# Patient Record
Sex: FEMALE | Race: WHITE | NOT HISPANIC OR LATINO | Employment: OTHER | URBAN - NONMETROPOLITAN AREA
[De-identification: names, ages, dates, MRNs, and addresses within clinical notes are randomized per-mention and may not be internally consistent; named-entity substitution may affect disease eponyms.]

---

## 2017-02-03 ENCOUNTER — ANESTHESIA (OUTPATIENT)
Dept: SURGERY | Facility: HOSPITAL | Age: 67
End: 2017-02-03
Payer: COMMERCIAL

## 2017-02-03 ENCOUNTER — ANESTHESIA EVENT (OUTPATIENT)
Dept: SURGERY | Facility: HOSPITAL | Age: 67
End: 2017-02-03
Payer: COMMERCIAL

## 2017-02-03 PROCEDURE — 25000125 ZZHC RX 250: Performed by: NURSE ANESTHETIST, CERTIFIED REGISTERED

## 2017-02-03 PROCEDURE — 45380 COLONOSCOPY AND BIOPSY: CPT | Performed by: ANESTHESIOLOGY

## 2017-02-03 PROCEDURE — 25000128 H RX IP 250 OP 636: Performed by: NURSE ANESTHETIST, CERTIFIED REGISTERED

## 2017-02-03 PROCEDURE — 45380 COLONOSCOPY AND BIOPSY: CPT | Performed by: NURSE ANESTHETIST, CERTIFIED REGISTERED

## 2017-02-03 RX ORDER — PROPOFOL 10 MG/ML
INJECTION, EMULSION INTRAVENOUS PRN
Status: DISCONTINUED | OUTPATIENT
Start: 2017-02-03 | End: 2017-02-03

## 2017-02-03 RX ORDER — LIDOCAINE HYDROCHLORIDE 20 MG/ML
INJECTION, SOLUTION INFILTRATION; PERINEURAL PRN
Status: DISCONTINUED | OUTPATIENT
Start: 2017-02-03 | End: 2017-02-03

## 2017-02-03 RX ADMIN — PROPOFOL 20 MG: 10 INJECTION, EMULSION INTRAVENOUS at 14:18

## 2017-02-03 RX ADMIN — PROPOFOL 20 MG: 10 INJECTION, EMULSION INTRAVENOUS at 14:06

## 2017-02-03 RX ADMIN — MIDAZOLAM HYDROCHLORIDE 2 MG: 1 INJECTION, SOLUTION INTRAMUSCULAR; INTRAVENOUS at 13:56

## 2017-02-03 RX ADMIN — PROPOFOL 30 MG: 10 INJECTION, EMULSION INTRAVENOUS at 14:00

## 2017-02-03 RX ADMIN — PROPOFOL 20 MG: 10 INJECTION, EMULSION INTRAVENOUS at 14:13

## 2017-02-03 RX ADMIN — PROPOFOL 20 MG: 10 INJECTION, EMULSION INTRAVENOUS at 14:09

## 2017-02-03 RX ADMIN — PROPOFOL 20 MG: 10 INJECTION, EMULSION INTRAVENOUS at 14:16

## 2017-02-03 RX ADMIN — LIDOCAINE HYDROCHLORIDE 40 MG: 20 INJECTION, SOLUTION INFILTRATION; PERINEURAL at 14:00

## 2017-02-03 RX ADMIN — PROPOFOL 20 MG: 10 INJECTION, EMULSION INTRAVENOUS at 14:03

## 2017-02-03 RX ADMIN — PROPOFOL 20 MG: 10 INJECTION, EMULSION INTRAVENOUS at 14:01

## 2017-02-03 NOTE — ANESTHESIA CARE TRANSFER NOTE
Patient: Fina Matt    Procedure(s):  Colonoscopy with biopsy - Wound Class: II-Clean Contaminated    Diagnosis: IBD  Diagnosis Additional Information: No value filed.    Anesthesia Type:   MAC     Note:  Airway :Nasal Cannula  Patient transferred to:Phase II        Vitals: (Last set prior to Anesthesia Care Transfer)    CRNA VITALS  2/3/2017 1353 - 2/3/2017 1426      2/3/2017             Resp Rate (set): 8                Electronically Signed By: RANDA Wells CRNA  February 3, 2017  2:26 PM

## 2017-02-03 NOTE — ANESTHESIA PREPROCEDURE EVALUATION
Anesthesia Evaluation     . Pt has had prior anesthetic.     History of anesthetic complications  - PONV    ROS/MED HX    ENT/Pulmonary:       Neurologic:  - neg neurologic ROS     Cardiovascular:  - neg cardiovascular ROS       METS/Exercise Tolerance:     Hematologic:  - neg hematologic  ROS       Musculoskeletal:   (+) arthritis, , , other musculoskeletal- Chronic Back pain      GI/Hepatic:     (+) GERD Inflammatory bowel disease, bowel prep, Other GI/Hepatic Diverticulosis of colon      Renal/Genitourinary:     (+) Other Renal/ Genitourinary, Urinary incontinence with continuous leakage      Endo:  - neg endo ROS       Psychiatric:  - neg psychiatric ROS       Infectious Disease:  - neg infectious disease ROS       Malignancy:         Other:    (+) H/O Chronic Pain,  - neg other ROS           Physical Exam  Normal systems: cardiovascular and pulmonary    Airway   Mallampati: III  TM distance: >3 FB  Neck ROM: full    Dental   (+) caps    Cardiovascular   Rhythm and rate: regular and normal      Pulmonary    breath sounds clear to auscultation                    Anesthesia Plan      History & Physical Review  History and physical reviewed and following examination; no interval change.    ASA Status:  2 .    NPO Status:  > 8 hours    Plan for MAC with Intravenous and Propofol induction. Maintenance will be TIVA.    PONV prophylaxis:  Ondansetron (or other 5HT-3) and Dexamethasone or Solumedrol  Surgeon requests deep sedation. Patient has a Mallampati 3 airway. Will provide MAC.      Postoperative Care  Postoperative pain management:  Multi-modal analgesia.      Consents  Anesthetic plan, risks, benefits and alternatives discussed with:  Patient..                          .

## 2017-02-06 NOTE — ANESTHESIA POSTPROCEDURE EVALUATION
Patient: Fina Matt    Procedure(s):  Colonoscopy with biopsy - Wound Class: II-Clean Contaminated    Diagnosis:IBD  Diagnosis Additional Information: No value filed.    Anesthesia Type:  MAC    Note:  Anesthesia Post Evaluation    Patient location during evaluation: Phase 2 and Bedside  Patient participation: Able to fully participate in evaluation  Level of consciousness: awake and alert  Pain management: adequate  Airway patency: patent  Cardiovascular status: acceptable  Respiratory status: acceptable  Hydration status: stable  PONV: none     Anesthetic complications: None          Last vitals:  Filed Vitals:    02/03/17 1440 02/03/17 1445 02/03/17 1450   BP: 106/72 120/77 113/74   Resp: 16  16   SpO2: 94% 93% 96%         Electronically Signed By: Wilman Zambrano MD  February 6, 2017  5:47 AM

## 2019-06-06 ENCOUNTER — TRANSFERRED RECORDS (OUTPATIENT)
Dept: HEALTH INFORMATION MANAGEMENT | Facility: OTHER | Age: 69
End: 2019-06-06

## 2019-10-03 ENCOUNTER — HEALTH MAINTENANCE LETTER (OUTPATIENT)
Age: 69
End: 2019-10-03

## 2020-01-08 ENCOUNTER — TRANSFERRED RECORDS (OUTPATIENT)
Dept: HEALTH INFORMATION MANAGEMENT | Facility: OTHER | Age: 70
End: 2020-01-08

## 2020-02-08 ENCOUNTER — HEALTH MAINTENANCE LETTER (OUTPATIENT)
Age: 70
End: 2020-02-08

## 2020-11-07 ENCOUNTER — HEALTH MAINTENANCE LETTER (OUTPATIENT)
Age: 70
End: 2020-11-07

## 2020-12-03 ENCOUNTER — TRANSFERRED RECORDS (OUTPATIENT)
Dept: HEALTH INFORMATION MANAGEMENT | Facility: OTHER | Age: 70
End: 2020-12-03

## 2020-12-04 ENCOUNTER — HOSPITAL ENCOUNTER (OUTPATIENT)
Dept: GENERAL RADIOLOGY | Facility: OTHER | Age: 70
End: 2020-12-04
Attending: FAMILY MEDICINE
Payer: COMMERCIAL

## 2020-12-04 ENCOUNTER — HOSPITAL ENCOUNTER (OUTPATIENT)
Dept: MAMMOGRAPHY | Facility: OTHER | Age: 70
End: 2020-12-04
Attending: FAMILY MEDICINE
Payer: COMMERCIAL

## 2020-12-04 ENCOUNTER — OFFICE VISIT (OUTPATIENT)
Dept: FAMILY MEDICINE | Facility: OTHER | Age: 70
End: 2020-12-04
Attending: FAMILY MEDICINE
Payer: COMMERCIAL

## 2020-12-04 VITALS
SYSTOLIC BLOOD PRESSURE: 124 MMHG | RESPIRATION RATE: 16 BRPM | HEIGHT: 64 IN | HEART RATE: 73 BPM | DIASTOLIC BLOOD PRESSURE: 66 MMHG | TEMPERATURE: 97.7 F | BODY MASS INDEX: 23.76 KG/M2 | OXYGEN SATURATION: 97 % | WEIGHT: 139.2 LBS

## 2020-12-04 DIAGNOSIS — R22.41 FOOT MASS, RIGHT: ICD-10-CM

## 2020-12-04 DIAGNOSIS — Z00.00 ENCOUNTER FOR MEDICARE ANNUAL WELLNESS EXAM: Primary | ICD-10-CM

## 2020-12-04 DIAGNOSIS — Z23 ENCOUNTER FOR IMMUNIZATION: ICD-10-CM

## 2020-12-04 DIAGNOSIS — Z13.220 SCREENING CHOLESTEROL LEVEL: ICD-10-CM

## 2020-12-04 DIAGNOSIS — R25.2 MUSCLE CRAMPS: ICD-10-CM

## 2020-12-04 DIAGNOSIS — Z12.31 VISIT FOR SCREENING MAMMOGRAM: ICD-10-CM

## 2020-12-04 DIAGNOSIS — L57.0 AK (ACTINIC KERATOSIS): ICD-10-CM

## 2020-12-04 DIAGNOSIS — L65.9 HAIR LOSS: ICD-10-CM

## 2020-12-04 LAB
ALBUMIN SERPL-MCNC: 4.5 G/DL (ref 3.5–5.7)
ALP SERPL-CCNC: 57 U/L (ref 34–104)
ALT SERPL W P-5'-P-CCNC: 12 U/L (ref 7–52)
ANION GAP SERPL CALCULATED.3IONS-SCNC: 5 MMOL/L (ref 3–14)
AST SERPL W P-5'-P-CCNC: 16 U/L (ref 13–39)
BILIRUB SERPL-MCNC: 0.5 MG/DL (ref 0.3–1)
BUN SERPL-MCNC: 19 MG/DL (ref 7–25)
CALCIUM SERPL-MCNC: 9.7 MG/DL (ref 8.6–10.3)
CHLORIDE SERPL-SCNC: 104 MMOL/L (ref 98–107)
CHOLEST SERPL-MCNC: 168 MG/DL
CO2 SERPL-SCNC: 30 MMOL/L (ref 21–31)
CREAT SERPL-MCNC: 0.8 MG/DL (ref 0.6–1.2)
ERYTHROCYTE [DISTWIDTH] IN BLOOD BY AUTOMATED COUNT: 12.8 % (ref 10–15)
GFR SERPL CREATININE-BSD FRML MDRD: 71 ML/MIN/{1.73_M2}
GLUCOSE SERPL-MCNC: 104 MG/DL (ref 70–105)
HCT VFR BLD AUTO: 42.9 % (ref 35–47)
HDLC SERPL-MCNC: 63 MG/DL (ref 23–92)
HGB BLD-MCNC: 13.8 G/DL (ref 11.7–15.7)
LDLC SERPL CALC-MCNC: 92 MG/DL
MAGNESIUM SERPL-MCNC: 2 MG/DL (ref 1.9–2.7)
MCH RBC QN AUTO: 29.1 PG (ref 26.5–33)
MCHC RBC AUTO-ENTMCNC: 32.2 G/DL (ref 31.5–36.5)
MCV RBC AUTO: 90 FL (ref 78–100)
NONHDLC SERPL-MCNC: 105 MG/DL
PLATELET # BLD AUTO: 270 10E9/L (ref 150–450)
POTASSIUM SERPL-SCNC: 4.9 MMOL/L (ref 3.5–5.1)
PROT SERPL-MCNC: 7 G/DL (ref 6.4–8.9)
RBC # BLD AUTO: 4.75 10E12/L (ref 3.8–5.2)
SODIUM SERPL-SCNC: 139 MMOL/L (ref 134–144)
TRIGL SERPL-MCNC: 66 MG/DL
WBC # BLD AUTO: 4.7 10E9/L (ref 4–11)

## 2020-12-04 PROCEDURE — 73630 X-RAY EXAM OF FOOT: CPT | Mod: RT

## 2020-12-04 PROCEDURE — 77063 BREAST TOMOSYNTHESIS BI: CPT

## 2020-12-04 PROCEDURE — G0009 ADMIN PNEUMOCOCCAL VACCINE: HCPCS

## 2020-12-04 PROCEDURE — G0463 HOSPITAL OUTPT CLINIC VISIT: HCPCS

## 2020-12-04 PROCEDURE — 80053 COMPREHEN METABOLIC PANEL: CPT | Mod: ZL | Performed by: FAMILY MEDICINE

## 2020-12-04 PROCEDURE — 83735 ASSAY OF MAGNESIUM: CPT | Mod: ZL | Performed by: FAMILY MEDICINE

## 2020-12-04 PROCEDURE — G0008 ADMIN INFLUENZA VIRUS VAC: HCPCS

## 2020-12-04 PROCEDURE — 80061 LIPID PANEL: CPT | Mod: ZL | Performed by: FAMILY MEDICINE

## 2020-12-04 PROCEDURE — 36415 COLL VENOUS BLD VENIPUNCTURE: CPT | Mod: ZL | Performed by: FAMILY MEDICINE

## 2020-12-04 PROCEDURE — G0463 HOSPITAL OUTPT CLINIC VISIT: HCPCS | Mod: 25

## 2020-12-04 PROCEDURE — 85027 COMPLETE CBC AUTOMATED: CPT | Mod: ZL | Performed by: FAMILY MEDICINE

## 2020-12-04 PROCEDURE — 99397 PER PM REEVAL EST PAT 65+ YR: CPT | Mod: 33 | Performed by: FAMILY MEDICINE

## 2020-12-04 RX ORDER — OMEPRAZOLE 40 MG/1
CAPSULE, DELAYED RELEASE ORAL
COMMUNITY
Start: 2020-05-29

## 2020-12-04 RX ORDER — MESALAMINE 1.2 G/1
TABLET, DELAYED RELEASE ORAL
COMMUNITY
Start: 2020-10-17

## 2020-12-04 ASSESSMENT — MIFFLIN-ST. JEOR: SCORE: 1136.41

## 2020-12-04 ASSESSMENT — PAIN SCALES - GENERAL: PAINLEVEL: NO PAIN (0)

## 2020-12-04 NOTE — PROGRESS NOTES
"SUBJECTIVE:   Fina Matt is a 70 year old female who presents for Preventive Visit.    Patient has been advised of split billing requirements and indicates understanding: Yes  Are you in the first 12 months of your Medicare Part B coverage?  No    Physical Health:    In general, how would you rate your overall physical health? good    Outside of work, how many days during the week do you exercise? 6-7 days/week    Outside of work, approximately how many minutes a day do you exercise?greater than 60 minutes    If you drink alcohol do you typically have >3 drinks per day or >7 drinks per week? No    Do you usually eat at least 4 servings of fruit and vegetables a day, include whole grains & fiber and avoid regularly eating high fat or \"junk\" foods? NO    Do you have any problems taking medications regularly?  No    Do you have any side effects from medications? hair loss    Needs assistance for the following daily activities: no assistance needed    Which of the following safety concerns are present in your home?  throw rugs in the hallway and lack of grab bars in the bathroom     Hearing impairment: No    In the past 6 months, have you been bothered by leaking of urine? yes    Mental Health:    In general, how would you rate your overall mental or emotional health? good  PHQ-2 Score: 0    Do you feel safe in your environment? Yes    Risk for STI?: denies  Last pap: hyst  Any hx of abnormal paps:  no  FH of early CA?: denies  Tobacco?: denies  Calcium intake: supplement  DEXA: Due  Last mammo: 12/4/2020 normal  Colonoscopy: [place] 2017: Severe diverticulosis, Internal Hemorrhoids  Immunizations: flu + Pneumo due (hasn't gotten in many years)      Have you ever done Advance Care Planning? (For example, a Health Directive, POLST, or a discussion with a medical provider or your loved ones about your wishes): No, advance care planning information given to patient to review.  Patient declined advance care planning " discussion at this time.    Additional concerns to address? Yes    Acute issues:   Hair loss with switching from balsalazide to mesalamine for her ulcerative colitis. She follows with Dr. [facility].  Right foot- mass on right fifth toe and under great toe. Painful with ambulation.  Skin lesion- seb keratosis under left bra area, 4mm, rough.     Fall risk:  Fallen 2 or more times in the past year?: No  Any fall with injury in the past year?: No  click delete button to remove this line now  Cognitive Screenin) Repeat 3 items (Leader, Season, Table)    2) Clock draw: normal  3) 3 item recall: Recalls 2 objects   Results: NORMAL clock, 1-2 items recalled:  COGNITIVE IMPAIRMENT LESS LIKELY    Mini-CogTM Copyright S [employer]. Licensed by the author for use in [facility] [employer]; reprinted with permission (tenzin@[facility]). All rights reserved.      Do you have sleep apnea, excessive snoring or daytime drowsiness?: no    Reviewed and updated as needed this visit by clinical staff  Tobacco  Allergies  Meds            Reviewed and updated as needed this visit by Provider                Social History     Tobacco Use     Smoking status: Never Smoker     Smokeless tobacco: Never Used   Substance Use Topics     Alcohol use: Yes     Comment: rare                      Current providers sharing in care for this patient include:   Patient Care Team:  Maik Lopez MD as PCP - General    The following health maintenance items are reviewed in Epic and correct as of today:  Health Maintenance   Topic Date Due     DEXA  1950     MAMMO SCREENING  1950     HEPATITIS C SCREENING  1968     DTAP/TDAP/TD IMMUNIZATION (1 - Tdap) 1975     LIPID  1995     ZOSTER IMMUNIZATION (1 of 2) 2000     MEDICARE ANNUAL WELLNESS VISIT  2015     FALL RISK ASSESSMENT  2015     Pneumococcal Vaccine: 65+ Years (1 of 1 - PPSV23) 2015     INFLUENZA VACCINE (1) 2020     ADVANCE  "CARE PLANNING  12/04/2025     COLORECTAL CANCER SCREENING  03/24/2027     PHQ-2  Completed     Pneumococcal Vaccine: Pediatrics (0 to 5 Years) and At-Risk Patients (6 to 64 Years)  Aged Out     IPV IMMUNIZATION  Aged Out     MENINGITIS IMMUNIZATION  Aged Out     HEPATITIS B IMMUNIZATION  Aged Out     Lab work is in process    ROS:  Muscle cramps, hair loss. Foot pain. Constitutional, HEENT, cardiovascular, pulmonary, GI and  systems are negative, except as otherwise noted.    OBJECTIVE:   /66 (BP Location: Right arm, Patient Position: Sitting, Cuff Size: Adult Regular)   Pulse 73   Temp 97.7  F (36.5  C) (Tympanic)   Resp 16   Ht 1.626 m (5' 4\")   Wt 63.1 kg (139 lb 3.2 oz)   SpO2 97%   Breastfeeding No   BMI 23.89 kg/m   Estimated body mass index is 23.89 kg/m  as calculated from the following:    Height as of this encounter: 1.626 m (5' 4\").    Weight as of this encounter: 63.1 kg (139 lb 3.2 oz).  EXAM:   GENERAL: healthy, alert and no distress  EYES: Eyes grossly normal to inspection, PERRL and conjunctivae and sclerae normal  HENT: ear canals and TM's normal, nose and mouth without ulcers or lesions  NECK: no adenopathy, no asymmetry, masses, or scars and thyroid normal to palpation  RESP: lungs clear to auscultation - no rales, rhonchi or wheezes  CV: regular rate and rhythm, normal S1 S2, no S3 or S4, no murmur, click or rub, no peripheral edema and peripheral pulses strong  Breast: deferred per patient   ABDOMEN: soft, nontender, no hepatosplenomegaly, no masses and bowel sounds normal  MS: no gross musculoskeletal defects noted, no edema  SKIN:  seb keratosis under left bra area, 4mm, rough.   NEURO: Normal strength and tone, mentation intact and speech normal  PSYCH: mentation appears normal, affect normal/bright  LYMPH: no cervical, supraclavicular, axillary, or inguinal adenopathy  EXT: right foot with lump under great toe and pinky toe- no joint swelling or warmth or erythema. Normal " "ROM.     XR Right Foot:  FINDINGS: No fracture or dislocation is seen. There is no focal bone  lesion or significant degenerative change.                                                                     IMPRESSION: No acute bony abnormality.    ASSESSMENT / PLAN:       ICD-10-CM    1. Encounter for Medicare annual wellness exam  Z00.00 Comprehensive Metabolic Panel     CBC W PLT No Diff     CBC W PLT No Diff     Comprehensive Metabolic Panel   2. Screening cholesterol level  Z13.220 Lipid Panel     Lipid Panel   3. Hair loss  L65.9 TSH Reflex GH     Biotin 5000 MCG TABS   4. Foot mass, right  R22.41 XR Foot Right G/E 3 Views   5. Muscle cramps  R25.2 Magnesium     Magnesium   6. Encounter for immunization  Z23 GH IMM-  PNEUMOCOCCAL CONJ VACCINE 13 VALENT IM (Prevnar)   7. SK - monitor for growth. Discussed biospy removal if it continues to rub on her bra line or become irritated.     Pt is not signed up for [employer]- she would like a abner with her results.     Patient has been advised of split billing requirements and indicates understanding: Yes    COUNSELING:  Reviewed preventive health counseling, as reflected in patient instructions    Estimated body mass index is 23.89 kg/m  as calculated from the following:    Height as of this encounter: 1.626 m (5' 4\").    Weight as of this encounter: 63.1 kg (139 lb 3.2 oz).    She reports that she has never smoked. She has never used smokeless tobacco.    Appropriate preventive services were discussed with this patient, including applicable screening as appropriate for cardiovascular disease, diabetes, osteopenia/osteoporosis, and glaucoma.  As appropriate for age/gender, discussed screening for colorectal cancer, prostate cancer, breast cancer, and cervical cancer. Checklist reviewing preventive services available has been given to the patient.    Reviewed patients plan of care and provided an AVS. The Basic Care Plan (routine screening as documented in Health " Maintenance) for Fina meets the Care Plan requirement. This Care Plan has been established and reviewed with the Patient.    Counseling Resources:  ATP IV Guidelines  Pooled Cohorts Equation Calculator  Breast Cancer Risk Calculator  BRCA-Related Cancer Risk Assessment: FHS-7 Tool  FRAX Risk Assessment  ICSI Preventive Guidelines  Dietary Guidelines for Americans, 2010  USDA's MyPlate  ASA Prophylaxis  Lung CA Screening    Veronica Castanon MD  [facility] AND \[facility] [place] [facility]\""

## 2020-12-04 NOTE — NURSING NOTE
"Patient presents to the clinic for annual medicare wellness exam. States she is still having hair loss which she believes is from her medications. She also states she has two bumps that feel like \"rocks\" on the bottom of her right foot.  Chief Complaint   Patient presents with     Physical     Medicare       Initial /66 (BP Location: Right arm, Patient Position: Sitting, Cuff Size: Adult Regular)   Pulse 73   Temp 97.7  F (36.5  C) (Tympanic)   Resp 16   Ht 1.626 m (5' 4\")   Wt 63.1 kg (139 lb 3.2 oz)   SpO2 97%   Breastfeeding No   BMI 23.89 kg/m   Estimated body mass index is 23.89 kg/m  as calculated from the following:    Height as of this encounter: 1.626 m (5' 4\").    Weight as of this encounter: 63.1 kg (139 lb 3.2 oz).  Medication Reconciliation: complete    Yanet Deshpande LPN    "

## 2020-12-04 NOTE — PATIENT INSTRUCTIONS
Patient Education   Personalized Prevention Plan  You are due for the preventive services outlined below.  Your care team is available to assist you in scheduling these services.  If you have already completed any of these items, please share that information with your care team to update in your medical record.  Health Maintenance Due   Topic Date Due     Osteoporosis Screening  1950     Mammogram  1950     Hepatitis C Screening  06/14/1968     Diptheria Tetanus Pertussis (DTAP/TDAP/TD) Vaccine (1 - Tdap) 06/14/1975     Cholesterol Lab  06/14/1995     Zoster (Shingles) Vaccine (1 of 2) 06/14/2000     FALL RISK ASSESSMENT  06/14/2015     Pneumococcal Vaccine (1 of 1 - PPSV23) 06/14/2015     Flu Vaccine (1) 09/01/2020        Patient Education     Treating Frozen Shoulder: Preparing for Your Exercises  Doing special exercises is the first way to treat frozen shoulder. You may see a physical therapist who can help you learn to do them. If these exercises don t help, you may need further medical treatment.   Shoulder stretches    Doing stretches is often the best way to treat frozen shoulder. Stretching each day can help lessen the pain and restore shoulder flexibility. But it often takes a significant amount of time before you notice results. Try to be patient.   To warm up, do the  pendulum.  While standing, let the hand on your frozen side dangle freely as you hold the back of a chair or a table top with your other hand. Slowly make circles and side-to-side motions with the frozen arm.   Physical therapy  Your healthcare provider may refer you to physical therapy. This hands-on care helps you learn how to do stretching exercises at home. A physical therapist may also work on restoring your shoulder flexibility. To do this, he or she may gently stretch and move your frozen shoulder.   Tips for shoulder stretches    Anti-inflammatory medicines or steroid injections can help relieve pain. This may help you  do your stretches. Your healthcare provider can tell you more.    Mild and moist heat can help loosen your shoulder. Try taking a warm shower or bath just before you stretch.    A cold or ice pack can limit pain and swelling. Try icing your shoulder for a few minutes after you do your stretches.    [employer]Well last reviewed this educational content on 5/1/2018 2000-2020 [employer]. [address] [place] [address]. All rights reserved. This information is not intended as a substitute for professional medical care. Always follow your healthcare professional's instructions.           Patient Education     Exercises for Shoulder Flexibility: Broom Stretch    Improving your flexibility can reduce pain. Stretching exercises also can help increase your range of pain-free motion. Breathe normally when you exercise. Try to use smooth, fluid movements. Never force a stretch.    Stand up or lie on the floor. Place the palm of your hand over the end of a broomstick or cane. Grasp the stick farther down with the other hand, palm facing down.    Push the end of the stick up on the side of your injured shoulder until you feel a stretch.    Hold for a few seconds. Return to the starting position.    Repeat 3 to 5 times. Build up to holding each stretch for 10 to 30 seconds.  Note: Follow any special instructions you are given. If you feel pain, stop the exercise. If the pain continues after stopping, call your healthcare provider.  [employer] last reviewed this educational content on 2/1/2018 2000-2020 [employer]. [address] [place] [address]. All rights reserved. This information is not intended as a substitute for professional medical care. Always follow your healthcare professional's instructions.

## 2020-12-06 RX ORDER — ANTIARTHRITIC COMBINATION NO.2 900 MG
1 TABLET ORAL DAILY
Qty: 90 TABLET | Refills: 1 | Status: SHIPPED | OUTPATIENT
Start: 2020-12-06

## 2020-12-07 ENCOUNTER — TELEPHONE (OUTPATIENT)
Dept: FAMILY MEDICINE | Facility: OTHER | Age: 70
End: 2020-12-07

## 2020-12-07 NOTE — TELEPHONE ENCOUNTER
Patient is wondering if her lab and xray results.   Also question about hair loss.   Please call patient.   Thank You Deb Mattson on 12/7/2020 at 11:40 AM

## 2020-12-07 NOTE — TELEPHONE ENCOUNTER
Called and verified patient full name and . Notified patient that CCN was not in clinic until Wednesday. Did notify her that all labs and Xray looked good and will call later this week with specific details. Also that RX was sent over yesterday.     Kasey Hubbard LPN on 2020 at 11:48 AM

## 2020-12-09 PROBLEM — R25.2 MUSCLE CRAMPS: Status: ACTIVE | Noted: 2020-12-09

## 2020-12-09 PROBLEM — L65.9 HAIR LOSS: Status: ACTIVE | Noted: 2020-12-09

## 2020-12-09 PROBLEM — L57.0 AK (ACTINIC KERATOSIS): Status: ACTIVE | Noted: 2020-12-09

## 2021-03-05 ENCOUNTER — IMMUNIZATION (OUTPATIENT)
Dept: FAMILY MEDICINE | Facility: OTHER | Age: 71
End: 2021-03-05
Attending: FAMILY MEDICINE
Payer: COMMERCIAL

## 2021-03-05 PROCEDURE — 91300 PR COVID VAC PFIZER DIL RECON 30 MCG/0.3 ML IM: CPT

## 2021-03-26 ENCOUNTER — IMMUNIZATION (OUTPATIENT)
Dept: FAMILY MEDICINE | Facility: OTHER | Age: 71
End: 2021-03-26
Attending: FAMILY MEDICINE
Payer: COMMERCIAL

## 2021-03-26 PROCEDURE — 91300 PR COVID VAC PFIZER DIL RECON 30 MCG/0.3 ML IM: CPT

## 2021-08-19 ENCOUNTER — TRANSFERRED RECORDS (OUTPATIENT)
Dept: HEALTH INFORMATION MANAGEMENT | Facility: OTHER | Age: 71
End: 2021-08-19

## 2021-09-05 ENCOUNTER — HEALTH MAINTENANCE LETTER (OUTPATIENT)
Age: 71
End: 2021-09-05

## 2021-10-12 ENCOUNTER — OFFICE VISIT (OUTPATIENT)
Dept: FAMILY MEDICINE | Facility: OTHER | Age: 71
End: 2021-10-12
Attending: FAMILY MEDICINE
Payer: COMMERCIAL

## 2021-10-12 VITALS
SYSTOLIC BLOOD PRESSURE: 124 MMHG | BODY MASS INDEX: 25.23 KG/M2 | WEIGHT: 142.4 LBS | HEIGHT: 63 IN | TEMPERATURE: 98.1 F | RESPIRATION RATE: 16 BRPM | DIASTOLIC BLOOD PRESSURE: 80 MMHG | HEART RATE: 84 BPM | OXYGEN SATURATION: 98 %

## 2021-10-12 DIAGNOSIS — R49.0 HOARSENESS: ICD-10-CM

## 2021-10-12 DIAGNOSIS — J02.9 SORE THROAT: Primary | ICD-10-CM

## 2021-10-12 DIAGNOSIS — R09.81 NASAL CONGESTION: ICD-10-CM

## 2021-10-12 PROCEDURE — C9803 HOPD COVID-19 SPEC COLLECT: HCPCS

## 2021-10-12 PROCEDURE — 99213 OFFICE O/P EST LOW 20 MIN: CPT | Performed by: FAMILY MEDICINE

## 2021-10-12 PROCEDURE — U0005 INFEC AGEN DETEC AMPLI PROBE: HCPCS | Mod: ZL | Performed by: FAMILY MEDICINE

## 2021-10-12 PROCEDURE — G0463 HOSPITAL OUTPT CLINIC VISIT: HCPCS

## 2021-10-12 ASSESSMENT — ENCOUNTER SYMPTOMS
DIARRHEA: 0
DIZZINESS: 0
HEADACHES: 0
SINUS PRESSURE: 0
RHINORRHEA: 1
CHILLS: 1
SORE THROAT: 1
VOMITING: 1
VOICE CHANGE: 1
SINUS PAIN: 1
FEVER: 0
MYALGIAS: 0
FATIGUE: 0
SHORTNESS OF BREATH: 0
COUGH: 1
LIGHT-HEADEDNESS: 0
PALPITATIONS: 0

## 2021-10-12 ASSESSMENT — PAIN SCALES - GENERAL: PAINLEVEL: MILD PAIN (2)

## 2021-10-12 ASSESSMENT — MIFFLIN-ST. JEOR: SCORE: 1130.05

## 2021-10-12 NOTE — NURSING NOTE
"Chief Complaint   Patient presents with     Pharyngitis     Throat Problem     Cough, head congestion    Initial /80   Pulse 84   Temp 98.1  F (36.7  C) (Tympanic)   Resp 16   Ht 1.6 m (5' 3\")   Wt 64.6 kg (142 lb 6.4 oz)   SpO2 98%   Breastfeeding No   BMI 25.23 kg/m   Estimated body mass index is 25.23 kg/m  as calculated from the following:    Height as of this encounter: 1.6 m (5' 3\").    Weight as of this encounter: 64.6 kg (142 lb 6.4 oz).     FOOD SECURITY SCREENING QUESTIONS  Hunger Vital Signs:  Within the past 12 months we worried whether our food would run out before we got money to buy more. Never  Within the past 12 months the food we bought just didn't last and we didn't have money to get more. Never      Medication Reconciliation: Complete      Norma J. Gosselin, KIKI   "

## 2021-10-12 NOTE — PROGRESS NOTES
"Assessment & Plan     Sore throat  Hoarseness  Nasal congestion  Her symptoms are consistent with COVID-19 in an individual who has been vaccinated, and she has had close contact with someone who tested positive.  She meets criteria for COVID-19 testing.  Test ordered.  If positive, she will need to quarantine for full ten days from date of positive test.  Counseled on symptom management and emergent symptoms requiring reevaluation.  If negative, symptoms are likley secondary to other viral infection.  No evidence of bacterial infection at this time.  Counseled on symptom management and follow-up with any signs/sympomts of bacterial infection.  - Symptomatic COVID-19 Virus (Coronavirus) by PCR Nose    Amaris Montoya,   [facility]    Subjective   Fina is a 71 year old who presents for the following health issues:    HPI     She reports a \"head cold.\"  Her symptoms started on Saturday with a sore throat.  She then developed nasal congestion, rhinorrhea, hoarseness of her voice, and primarily dry cough.  She has tried taking Menthol drops and a small amount of Robitussin which did not seem to help.  She reports that her daughter-in-law tested positive for COVID-19 yesterday.  Her last contact with her daughter-in-law was on Tuesday.  She has had her first two COVID-19 vaccines.    Review of Systems   Constitutional: Positive for chills. Negative for fatigue and fever.   HENT: Positive for congestion, ear pain, postnasal drip, rhinorrhea, sinus pain, sore throat and voice change. Negative for sinus pressure.    Respiratory: Positive for cough. Negative for shortness of breath.    Cardiovascular: Negative for chest pain and palpitations.   Gastrointestinal: Positive for vomiting. Negative for diarrhea.   Musculoskeletal: Negative for myalgias.   Skin: Negative for rash.   Neurological: Negative for dizziness, light-headedness and headaches.         Objective    /80   Pulse 84   Temp 98.1 " " F (36.7  C) (Tympanic)   Resp 16   Ht 1.6 m (5' 3\")   Wt 64.6 kg (142 lb 6.4 oz)   SpO2 98%   Breastfeeding No   BMI 25.23 kg/m    Body mass index is 25.23 kg/m .  Physical Exam  Constitutional:       General: She is not in acute distress.     Appearance: Normal appearance. She is not ill-appearing.   HENT:      Right Ear: A middle ear effusion is present. Tympanic membrane is not erythematous or bulging.      Left Ear: There is impacted cerumen.      Nose: Congestion present.      Mouth/Throat:      Mouth: Mucous membranes are moist.      Pharynx: Oropharynx is clear. No oropharyngeal exudate or posterior oropharyngeal erythema.   Cardiovascular:      Rate and Rhythm: Normal rate and regular rhythm.      Heart sounds: No murmur heard.   No friction rub. No gallop.    Pulmonary:      Effort: Pulmonary effort is normal.      Breath sounds: Normal breath sounds. No wheezing, rhonchi or rales.   Musculoskeletal:      Cervical back: Neck supple. No tenderness.   Lymphadenopathy:      Cervical: No cervical adenopathy.   Neurological:      Mental Status: She is alert.           "

## 2021-10-13 LAB — SARS-COV-2 RNA RESP QL NAA+PROBE: NEGATIVE

## 2021-12-01 ENCOUNTER — IMMUNIZATION (OUTPATIENT)
Dept: FAMILY MEDICINE | Facility: OTHER | Age: 71
End: 2021-12-01
Attending: FAMILY MEDICINE
Payer: COMMERCIAL

## 2021-12-01 PROCEDURE — 91300 PR COVID VAC PFIZER DIL RECON 30 MCG/0.3 ML IM: CPT

## 2022-01-03 ENCOUNTER — HOSPITAL ENCOUNTER (OUTPATIENT)
Dept: MAMMOGRAPHY | Facility: OTHER | Age: 72
End: 2022-01-03
Attending: FAMILY MEDICINE
Payer: COMMERCIAL

## 2022-01-03 ENCOUNTER — OFFICE VISIT (OUTPATIENT)
Dept: FAMILY MEDICINE | Facility: OTHER | Age: 72
End: 2022-01-03
Attending: FAMILY MEDICINE
Payer: COMMERCIAL

## 2022-01-03 VITALS
BODY MASS INDEX: 24.95 KG/M2 | TEMPERATURE: 97.4 F | RESPIRATION RATE: 18 BRPM | DIASTOLIC BLOOD PRESSURE: 68 MMHG | HEIGHT: 63 IN | HEART RATE: 68 BPM | WEIGHT: 140.8 LBS | OXYGEN SATURATION: 95 % | SYSTOLIC BLOOD PRESSURE: 124 MMHG

## 2022-01-03 DIAGNOSIS — K50.10 CROHN'S DISEASE OF COLON WITHOUT COMPLICATION (H): ICD-10-CM

## 2022-01-03 DIAGNOSIS — Z00.00 ENCOUNTER FOR MEDICARE ANNUAL WELLNESS EXAM: Primary | ICD-10-CM

## 2022-01-03 DIAGNOSIS — Z12.31 VISIT FOR SCREENING MAMMOGRAM: ICD-10-CM

## 2022-01-03 DIAGNOSIS — E28.39 ESTROGEN DEFICIENCY: ICD-10-CM

## 2022-01-03 DIAGNOSIS — Z00.00 HEALTH CARE MAINTENANCE: ICD-10-CM

## 2022-01-03 LAB
ALBUMIN SERPL-MCNC: 4.6 G/DL (ref 3.5–5.7)
ALP SERPL-CCNC: 59 U/L (ref 34–104)
ALT SERPL W P-5'-P-CCNC: 11 U/L (ref 7–52)
ANION GAP SERPL CALCULATED.3IONS-SCNC: 6 MMOL/L (ref 3–14)
AST SERPL W P-5'-P-CCNC: 12 U/L (ref 13–39)
BILIRUB SERPL-MCNC: 0.3 MG/DL (ref 0.3–1)
BUN SERPL-MCNC: 19 MG/DL (ref 7–25)
CALCIUM SERPL-MCNC: 10 MG/DL (ref 8.6–10.3)
CHLORIDE BLD-SCNC: 103 MMOL/L (ref 98–107)
CHOLEST SERPL-MCNC: 202 MG/DL
CO2 SERPL-SCNC: 30 MMOL/L (ref 21–31)
CREAT SERPL-MCNC: 0.84 MG/DL (ref 0.6–1.2)
ERYTHROCYTE [DISTWIDTH] IN BLOOD BY AUTOMATED COUNT: 13.1 % (ref 10–15)
FASTING STATUS PATIENT QL REPORTED: ABNORMAL
GFR SERPL CREATININE-BSD FRML MDRD: 74 ML/MIN/1.73M2
GLUCOSE BLD-MCNC: 95 MG/DL (ref 70–105)
HCT VFR BLD AUTO: 39.9 % (ref 35–47)
HDLC SERPL-MCNC: 60 MG/DL (ref 23–92)
HGB BLD-MCNC: 13.3 G/DL (ref 11.7–15.7)
LDLC SERPL CALC-MCNC: 124 MG/DL
MCH RBC QN AUTO: 29 PG (ref 26.5–33)
MCHC RBC AUTO-ENTMCNC: 33.3 G/DL (ref 31.5–36.5)
MCV RBC AUTO: 87 FL (ref 78–100)
NONHDLC SERPL-MCNC: 142 MG/DL
PLATELET # BLD AUTO: 272 10E3/UL (ref 150–450)
POTASSIUM BLD-SCNC: 4.6 MMOL/L (ref 3.5–5.1)
PROT SERPL-MCNC: 6.9 G/DL (ref 6.4–8.9)
RBC # BLD AUTO: 4.58 10E6/UL (ref 3.8–5.2)
SODIUM SERPL-SCNC: 139 MMOL/L (ref 134–144)
TRIGL SERPL-MCNC: 90 MG/DL
WBC # BLD AUTO: 4.9 10E3/UL (ref 4–11)

## 2022-01-03 PROCEDURE — G0009 ADMIN PNEUMOCOCCAL VACCINE: HCPCS

## 2022-01-03 PROCEDURE — 85041 AUTOMATED RBC COUNT: CPT | Mod: ZL | Performed by: FAMILY MEDICINE

## 2022-01-03 PROCEDURE — 80061 LIPID PANEL: CPT | Mod: ZL | Performed by: FAMILY MEDICINE

## 2022-01-03 PROCEDURE — 36415 COLL VENOUS BLD VENIPUNCTURE: CPT | Mod: ZL | Performed by: FAMILY MEDICINE

## 2022-01-03 PROCEDURE — G0439 PPPS, SUBSEQ VISIT: HCPCS | Performed by: FAMILY MEDICINE

## 2022-01-03 PROCEDURE — 77067 SCR MAMMO BI INCL CAD: CPT

## 2022-01-03 PROCEDURE — G0008 ADMIN INFLUENZA VIRUS VAC: HCPCS

## 2022-01-03 PROCEDURE — 80053 COMPREHEN METABOLIC PANEL: CPT | Mod: ZL | Performed by: FAMILY MEDICINE

## 2022-01-03 ASSESSMENT — PAIN SCALES - GENERAL: PAINLEVEL: NO PAIN (0)

## 2022-01-03 ASSESSMENT — MIFFLIN-ST. JEOR: SCORE: 1114.85

## 2022-01-03 ASSESSMENT — ACTIVITIES OF DAILY LIVING (ADL): CURRENT_FUNCTION: NO ASSISTANCE NEEDED

## 2022-01-03 NOTE — PATIENT INSTRUCTIONS
Patient Education   Personalized Prevention Plan  You are due for the preventive services outlined below.  Your care team is available to assist you in scheduling these services.  If you have already completed any of these items, please share that information with your care team to update in your medical record.  Health Maintenance Due   Topic Date Due     Osteoporosis Screening  Never done     Hepatitis C Screening  Never done     Diptheria Tetanus Pertussis (DTAP/TDAP/TD) Vaccine (1 - Tdap) Never done     Zoster (Shingles) Vaccine (1 of 2) Never done     Flu Vaccine (1) 09/01/2021     FALL RISK ASSESSMENT  12/04/2021     Annual Wellness Visit  12/04/2021     Pneumococcal Vaccine (2 of 2 - PPSV23) 12/04/2021     Shingles and TDAP can be obtained at your pharmacy.

## 2022-01-03 NOTE — PROGRESS NOTES
"SUBJECTIVE:   iFna Matt is a 71 year old female who presents for Preventive Visit.      Patient has been advised of split billing requirements and indicates understanding: Yes   Are you in the first 12 months of your Medicare coverage?  No    Healthy Habits:     In general, how would you rate your overall health?  Good    Frequency of exercise:  4-5 days/week    Duration of exercise:  30-45 minutes    Do you usually eat at least 4 servings of fruit and vegetables a day, include whole grains    & fiber and avoid regularly eating high fat or \"junk\" foods?  Yes    Taking medications regularly:  Yes    Medication side effects:  None    Ability to successfully perform activities of daily living:  No assistance needed    Home Safety:  No safety concerns identified    Hearing Impairment:  No hearing concerns    In the past 6 months, have you been bothered by leaking of urine?  No    In general, how would you rate your overall mental or emotional health?  Good      PHQ-2 Total Score: 0    Additional concerns today:  No    Do you feel safe in your environment? Yes    Have you ever done Advance Care Planning? (For example, a Health Directive, POLST, or a discussion with a medical provider or your loved ones about your wishes): No, advance care planning information given to patient to review.  Patient plans to discuss their wishes with loved ones or provider.         Fall risk  Fallen 2 or more times in the past year?: (P) No  Any fall with injury in the past year?: (P) No    Cognitive Screening   1) Repeat 3 items (Leader, Season, Table)    2) Clock draw: NORMAL  3) 3 item recall: Recalls 2 objects   Results: NORMAL clock, 1-2 items recalled: COGNITIVE IMPAIRMENT LESS LIKELY    Mini-CogTM Copyright LOLIS Gar. Licensed by the author for use in [facility]; reprinted with permission ([email or web address]). All rights reserved.      Do you have sleep apnea, excessive snoring or daytime drowsiness?: no    Reviewed and " "updated as needed this visit by clinical staff  Tobacco  Allergies  Meds   Med Hx  Surg Hx  Fam Hx  Soc Hx       Reviewed and updated as needed this visit by Provider               The 10-year ASCVD risk score (Erin SIMMONS Jr., et al., 2013) is: 9.8%    Values used to calculate the score:      Age: 71 years      Sex: Female      Is Non- : No      Diabetic: No      Tobacco smoker: No      Systolic Blood Pressure: 124 mmHg      Is BP treated: No      HDL Cholesterol: 60 mg/dL      Total Cholesterol: 202 mg/dL      Social History     Tobacco Use     Smoking status: Never Smoker     Smokeless tobacco: Never Used   Substance Use Topics     Alcohol use: Not Currently     Comment: rare       Alcohol Use 1/3/2022   Prescreen: >3 drinks/day or >7 drinks/week? No   Prescreen: >3 drinks/day or >7 drinks/week? -       Current providers sharing in care for this patient include:   Patient Care Team:  No Ref-Primary, Physician as PCP - Amaris Hendrickson DO as Assigned PCP    The following health maintenance items are reviewed in Epic and correct as of today:  Health Maintenance Due   Topic Date Due     DEXA  Never done     DTAP/TDAP/TD IMMUNIZATION (1 - Tdap) Never done     ZOSTER IMMUNIZATION (1 of 2) Never done     FALL RISK ASSESSMENT  12/04/2021         Review of Systems  Constitutional, HEENT, cardiovascular, pulmonary, gi and gu systems are negative, except as otherwise noted.    OBJECTIVE:   /68 (BP Location: Right arm, Patient Position: Sitting, Cuff Size: Adult Regular)   Pulse 68   Temp 97.4  F (36.3  C) (Tympanic)   Resp 18   Ht 1.588 m (5' 2.5\")   Wt 63.9 kg (140 lb 12.8 oz)   LMP  (LMP Unknown)   SpO2 95%   BMI 25.34 kg/m   Estimated body mass index is 25.34 kg/m  as calculated from the following:    Height as of this encounter: 1.588 m (5' 2.5\").    Weight as of this encounter: 63.9 kg (140 lb 12.8 oz).  Physical Exam  GENERAL: healthy, alert and no distress  NECK: no " "adenopathy, no asymmetry, masses, or scars and thyroid normal to palpation  RESP: lungs clear to auscultation - no rales, rhonchi or wheezes  CV: regular rate and rhythm, normal S1 S2, no S3 or S4, no murmur, click or rub, no peripheral edema and peripheral pulses strong  ABDOMEN: soft, nontender, no hepatosplenomegaly, no masses and bowel sounds normal  MS: no gross musculoskeletal defects noted, no edema    Diagnostic Test Results:  Labs reviewed in Epic    ASSESSMENT / PLAN:       ICD-10-CM    1. Encounter for Medicare annual wellness exam  Z00.00    2. Crohn's disease of colon without complication (H)  K50.10 CBC W PLT No Diff     CBC W PLT No Diff   3. Health care maintenance  Z00.00 Comprehensive Metabolic Panel     Lipid Panel     Comprehensive Metabolic Panel     Lipid Panel   4. Estrogen deficiency  E28.39 DX Hip/Pelvis/Spine     Labs today.  Bone density scan recommended.  Recommend Shingrix and Tdap through her pharmacy.  Influenza and pneumococcal today.  Has mammogram scheduled later today.    Patient has been advised of split billing requirements and indicates understanding: Yes  COUNSELING:      Estimated body mass index is 25.34 kg/m  as calculated from the following:    Height as of this encounter: 1.588 m (5' 2.5\").    Weight as of this encounter: 63.9 kg (140 lb 12.8 oz).        She reports that she has never smoked. She has never used smokeless tobacco.      Appropriate preventive services were discussed with this patient, including applicable screening as appropriate for cardiovascular disease, diabetes, osteopenia/osteoporosis, and glaucoma.  As appropriate for age/gender, discussed screening for colorectal cancer, prostate cancer, breast cancer, and cervical cancer. Checklist reviewing preventive services available has been given to the patient.    Reviewed patients plan of care and provided an AVS. The Basic Care Plan (routine screening as documented in [facility]) for Fina meets the " Care Plan requirement. This Care Plan has been established and reviewed with the Patient.    Counseling Resources:  ATP IV Guidelines  Pooled Cohorts Equation Calculator  Breast Cancer Risk Calculator  Breast Cancer: Medication to Reduce Risk  FRAX Risk Assessment  ICSI Preventive Guidelines  Dietary Guidelines for Americans, 2010  USDA's MyPlate  ASA Prophylaxis  Lung CA Screening    Deedee Martin MD  [facility]    Identified Health Risks:

## 2022-01-03 NOTE — NURSING NOTE
"Chief Complaint   Patient presents with     Wellness Visit     yearly       Initial /68 (BP Location: Right arm, Patient Position: Sitting, Cuff Size: Adult Regular)   Pulse 68   Temp 97.4  F (36.3  C) (Tympanic)   Resp 18   Ht 1.588 m (5' 2.5\")   Wt 63.9 kg (140 lb 12.8 oz)   LMP  (LMP Unknown)   SpO2 95%   BMI 25.34 kg/m   Estimated body mass index is 25.34 kg/m  as calculated from the following:    Height as of this encounter: 1.588 m (5' 2.5\").    Weight as of this encounter: 63.9 kg (140 lb 12.8 oz).  Medication Reconciliation: complete    Elli Hernandez LPN    Advance Care Directive reviewed    "

## 2022-01-13 ENCOUNTER — HOSPITAL ENCOUNTER (OUTPATIENT)
Dept: BONE DENSITY | Facility: OTHER | Age: 72
Discharge: HOME | End: 2022-01-13
Attending: FAMILY MEDICINE | Admitting: FAMILY MEDICINE
Payer: COMMERCIAL

## 2022-01-13 DIAGNOSIS — E28.39 ESTROGEN DEFICIENCY: ICD-10-CM

## 2022-01-13 PROCEDURE — 77080 DXA BONE DENSITY AXIAL: CPT

## 2022-10-19 ENCOUNTER — HOSPITAL ENCOUNTER (OUTPATIENT)
Dept: GENERAL RADIOLOGY | Facility: OTHER | Age: 72
Discharge: HOME | End: 2022-10-19
Attending: PHYSICIAN ASSISTANT
Payer: COMMERCIAL

## 2022-10-19 ENCOUNTER — OFFICE VISIT (OUTPATIENT)
Dept: FAMILY MEDICINE | Facility: OTHER | Age: 72
End: 2022-10-19
Attending: NURSE PRACTITIONER
Payer: COMMERCIAL

## 2022-10-19 VITALS
OXYGEN SATURATION: 97 % | DIASTOLIC BLOOD PRESSURE: 72 MMHG | BODY MASS INDEX: 24.45 KG/M2 | SYSTOLIC BLOOD PRESSURE: 127 MMHG | RESPIRATION RATE: 17 BRPM | TEMPERATURE: 97.8 F | HEART RATE: 70 BPM | WEIGHT: 143.2 LBS | HEIGHT: 64 IN

## 2022-10-19 DIAGNOSIS — M79.632 PAIN OF LEFT FOREARM: Primary | ICD-10-CM

## 2022-10-19 DIAGNOSIS — S60.212A CONTUSION OF LEFT WRIST, INITIAL ENCOUNTER: ICD-10-CM

## 2022-10-19 PROCEDURE — 73090 X-RAY EXAM OF FOREARM: CPT | Mod: LT

## 2022-10-19 PROCEDURE — 99213 OFFICE O/P EST LOW 20 MIN: CPT | Performed by: PHYSICIAN ASSISTANT

## 2022-10-19 PROCEDURE — G0463 HOSPITAL OUTPT CLINIC VISIT: HCPCS | Mod: 25 | Performed by: PHYSICIAN ASSISTANT

## 2022-10-19 ASSESSMENT — PAIN SCALES - GENERAL: PAINLEVEL: MODERATE PAIN (4)

## 2022-10-19 NOTE — PROGRESS NOTES
ASSESSMENT/PLAN:    I have reviewed the nursing notes.  I have reviewed the findings, diagnosis, plan and need for follow up with the patient.    1. Pain of left forearm  - XR Forearm Left 2 Views  - Imaging obtained today, negative for fracture or dislocation    2. Contusion of left wrist, initial encounter  - Wrist/Arm/Hand Supplies Order for DME - ONLY FOR DME  - Vital signs stable.  Physical exam consistent with contusion of left forearm.  XR: negative. Discussed that contusion are typically self-limiting and will heal in 4 to 8 weeks duration of time.  Recommend alternating Tylenol and ibuprofen every 4-6 hours if able, do not exceed daily limits as reviewed on AVS (4000 mg of Tylenol daily, 3200 mg of ibuprofen daily), alternate heat and ice, gentle range of motion as tolerated.  If an orthopedic referral was placed patient understands that they will contact the patient directly to schedule this visit.  Patient runs into any difficulties/setbacks during recovery they should follow-up with her PCP or orthopedics for reevaluation. Patient is in agreement and understanding of the above treatment plan. All questions and concerns were addressed and answered to patient's satisfaction. AVS reviewed with patient.     Discussed warning signs/symptoms indicative of need to f/u    Follow up if symptoms persist or worsen or concerns    I explained my diagnostic considerations and recommendations to the patient, who voiced understanding and agreement with the treatment plan. All questions were answered. We discussed potential side effects of any prescribed or recommended therapies, as well as expectations for response to treatments.    Leyla Dominguez PA-C  10/19/2022  10:01 AM    HPI:    Fina Matt is a 72 year old female  who presents to [facility] today for concerns of left lower arm pain and bruising which occurred after a fall yesterday. She was out feeding horse and was going between fences and caught her toe  and tripped her.     RHD/LHD: RHD  Pain: 4-5/10  Quality of pain: variable  Location: wrist/forearm  Palliative: rest  Provocative: picking up items  Numbness, tingling, burning: none currently, small amount of tingling in digits 2-3 yesterday  Bruising/edema/erythema: bruising (volar)  Treatments tried: ice, Ibuprofen  Prior falls, injuries or trauma: none  Additional symptoms to report: none    Allergies: PCN    Past Medical History:   Diagnosis Date     Arthritis      Chronic pain      Noninfectious ileitis      PONV (postoperative nausea and vomiting)     ALSO SLOW TO WAKE UP     Urinary incontinence with continuous leakage      Vaginal erosion due to surgical mesh (H)      Vaginal vault prolapse      Past Surgical History:   Procedure Laterality Date     COLONOSCOPY  2013    Procedure: COLONOSCOPY;  COLONOSCOPY with bx;  Surgeon: Shannan Garcia DO;  Location: [facility]     COLONOSCOPY N/A 2017    Procedure: COLONOSCOPY;  Surgeon: Ervin Hope MD;  Location: [facility]     COLONOSCOPY  2017    EXAMCOL   [facility]     ENDOSCOPY UPPER, COLONOSCOPY, COMBINED N/A 10/29/2015    Procedure: COMBINED ENDOSCOPY UPPER, COLONOSCOPY;  Surgeon: Ervin Hope MD;  Location: [facility]     ENT SURGERY      tonsillectomy     GYN SURGERY  2004,2008    hysterectomy, prolapse repair     GYN SURGERY       x 2     GYN SURGERY  2014    pelvic floor reconstruction, [facility]     HEAD & NECK SURGERY      jaw surgey     HERNIA REPAIR       Social History     Tobacco Use     Smoking status: Never     Smokeless tobacco: Never   Substance Use Topics     Alcohol use: Not Currently     Comment: rare     Current Outpatient Medications   Medication Sig Dispense Refill     Biotin 5000 MCG TABS Take 1 tablet by mouth daily 90 tablet 1     mesalamine (LIALDA) 1.2 g EC tablet        omeprazole (PRILOSEC) 40 MG DR capsule        Prenatal Vit-Fe Fumarate-FA (PRENATAL/FOLIC ACID) TABS Take 1 tablet by mouth daily       Probiotic  "Product (PROBIOTIC DAILY PO) Take 1 tablet by mouth daily       Psyllium (METAMUCIL PO) Take by mouth daily       BALSALAZIDE DISODIUM PO Take 2,250 mg by mouth 2 times daily Balsalazide 750 mg capsule, takes 3 capsules twice daily.       Calcium Carbonate (CALCIUM 600 PO) Take 1 tablet by mouth daily (Patient not taking: Reported on 10/19/2022)       Allergies   Allergen Reactions     Penicillins      Past medical history, past surgical history, current medications and allergies reviewed and accurate to the best of my knowledge.      ROS:  Refer to HPI    /72   Pulse 70   Temp 97.8  F (36.6  C) (Tympanic)   Resp 17   Ht 1.626 m (5' 4\")   Wt 65 kg (143 lb 3.2 oz)   LMP  (LMP Unknown)   SpO2 97%   BMI 24.58 kg/m      EXAM:  General Appearance: Well appearing 72 year old female, appropriate appearance for age. No acute distress   Respiratory: normal chest wall and respirations.  Normal effort.  Clear to auscultation bilaterally, no wheezing, crackles or rhonchi.  No increased work of breathing.  No cough appreciated.  MSK:  Left HAND PHYSICAL EXAMINATION:  Inspection: no signs of edema, bony deformity or skin abnormalities noted.    Palpation: Non tender over bony and soft tissue structures. Tenderness to palpation over anatomical snuffbox/scaphoid: No.     ROM/Strength:   - Thumb adduction/abduction/flexion/extension: ROM is full, fluid and intact  - Finger flexion/extension (MCP, DIP, PIP): ROM is full, fluid and intact  - Abduction/Adduction (2-5th MCP joint): ROM is full, fluid and intact  - Opposition: intact   -  strength: intact    Special testing: Kelvin exam normal    Neurovascular status: sensation and distal pulses intact.     Left WRIST PHYSICAL EXAMINATION:  General Examination of the wrist: no signs of bony deformity. Skin is intact, ecchymosis diffusely to volar wrist/forearm (1 inch x 1 inch region), mild edema.     Palpation: Tenderness to palpation: diffusely around the wrist. No TTP " any of the MCP, DIP or PIP joints.    ROM: flexion full, extension 70% normal and limited by pain, radial deviation full, ulnar deviation full, pronation full, supination full    Special Testing:   Tinel: negative   Phalen: negative   Finkelstein: negative     Muscular atrophy: No    Neurovascular status: Sensation is intact in the radial, ulnar and median nerve distributions supplying the distal hand/fingers. Kelvin test is normal. Distal pulses 2+.     LEFT ELBOW PHYSICAL EXAMINATION:  Inspection: skin is clean, dry and intact. No signs of prior or recent trauma. No bony deformities noted. No signs of olecranon bursitis. No valgus/varus deformities of the elbow noted.    Palpation: Non tender over bony and soft tissue structures    ROM: flexion full, extension full, pronation full and supination full.     Special Testing:   Valgus (UCL): stable to ligamentous stressing   Varus (RCL): stable to ligamentous stressing     Special testing if suspect medial/lateral epicondylitis:   Long finger test: negative   Cozen test: negative    Mill's test: negative    Medial epicondylitis: negative     Neurovascular Status: distal pulses and sensation intact.   Psychological: normal affect, alert, oriented, and pleasant.     Labs:  None     Xray:  Results for orders placed or performed in visit on 10/19/22   XR Forearm Left 2 Views     Status: None    Narrative    Exam: XR FOREARM LEFT 2 VIEWS    Technique: Left forearm, 2 views    Comparison: None.    Exam reason: fall to left forearm yesterday, pain over distal volar  forearm with edema and ecchymosis. pain with extension of wrist; Pain  of left forearm    Findings:  No acute fracture or dislocation. Joint spaces are well maintained.      There is soft tissue swelling of the volar distal forearm.      Impression    Impression:  No acute fracture or dislocation.    SHEY MORALES MD         SYSTEM ID:  [id number]

## 2022-10-19 NOTE — PATIENT INSTRUCTIONS
Please refer to your AVS for follow up and pain/symptoms management recommendations (I.e.: medications, helpful conservative treatment modalities, appropriate follow up if need to a specialist or family practice, etc.). Please return to urgent care if your symptoms change or worsen.     Discharge instructions:  -If you were prescribed a medication(s), please take this as prescribed/directed  -Monitor your symptoms, if changing/worsening, return to UC/ER or PCP for follow up    For pain control - we recommend alternating Tylenol and Ibuprofen if you are able to take these medications. Alternate every 4 hours as needed. I.e.: Ibuprofen at 8am, Tylenol 12pm, Ibuprofen 4pm    -Daily maximum of Tylenol is 4000mg (recommend staying under 3000mg)   -Daily maximum of Ibuprofen is 3200 mg  - Heat, ice, gentle stretching (among other remedies: biofreeze/icy hot, etc).     - I will call you if there are any changes to your treatment plan (such as a broken bone).     - Wean out of splint over the next 2 weeks (unless directed otherwise).

## 2022-10-29 ENCOUNTER — HEALTH MAINTENANCE LETTER (OUTPATIENT)
Age: 72
End: 2022-10-29

## 2023-01-04 ENCOUNTER — OFFICE VISIT (OUTPATIENT)
Dept: FAMILY MEDICINE | Facility: OTHER | Age: 73
End: 2023-01-04
Attending: FAMILY MEDICINE
Payer: COMMERCIAL

## 2023-01-04 ENCOUNTER — HOSPITAL ENCOUNTER (OUTPATIENT)
Dept: MAMMOGRAPHY | Facility: OTHER | Age: 73
Discharge: HOME | End: 2023-01-04
Attending: FAMILY MEDICINE
Payer: COMMERCIAL

## 2023-01-04 VITALS
HEART RATE: 72 BPM | WEIGHT: 137 LBS | DIASTOLIC BLOOD PRESSURE: 62 MMHG | TEMPERATURE: 97 F | OXYGEN SATURATION: 98 % | HEIGHT: 63 IN | BODY MASS INDEX: 24.27 KG/M2 | SYSTOLIC BLOOD PRESSURE: 118 MMHG

## 2023-01-04 DIAGNOSIS — K50.10 CROHN'S DISEASE OF COLON WITHOUT COMPLICATION (H): ICD-10-CM

## 2023-01-04 DIAGNOSIS — M81.0 OSTEOPOROSIS, UNSPECIFIED OSTEOPOROSIS TYPE, UNSPECIFIED PATHOLOGICAL FRACTURE PRESENCE: ICD-10-CM

## 2023-01-04 DIAGNOSIS — Z00.00 HEALTH CARE MAINTENANCE: Primary | ICD-10-CM

## 2023-01-04 DIAGNOSIS — E78.5 DYSLIPIDEMIA: ICD-10-CM

## 2023-01-04 DIAGNOSIS — Z12.31 VISIT FOR SCREENING MAMMOGRAM: ICD-10-CM

## 2023-01-04 DIAGNOSIS — R20.2 PARESTHESIA: ICD-10-CM

## 2023-01-04 LAB
ALBUMIN SERPL BCG-MCNC: 4.5 G/DL (ref 3.5–5.2)
ALP SERPL-CCNC: 82 U/L (ref 35–104)
ALT SERPL W P-5'-P-CCNC: 14 U/L (ref 10–35)
ANION GAP SERPL CALCULATED.3IONS-SCNC: 8 MMOL/L (ref 7–15)
AST SERPL W P-5'-P-CCNC: 17 U/L (ref 10–35)
BILIRUB SERPL-MCNC: 0.3 MG/DL
BUN SERPL-MCNC: 15.7 MG/DL (ref 8–23)
CALCIUM SERPL-MCNC: 9.3 MG/DL (ref 8.8–10.2)
CHLORIDE SERPL-SCNC: 103 MMOL/L (ref 98–107)
CHOLEST SERPL-MCNC: 163 MG/DL
CREAT SERPL-MCNC: 0.67 MG/DL (ref 0.51–0.95)
DEPRECATED HCO3 PLAS-SCNC: 28 MMOL/L (ref 22–29)
ERYTHROCYTE [DISTWIDTH] IN BLOOD BY AUTOMATED COUNT: 12.9 % (ref 10–15)
GFR SERPL CREATININE-BSD FRML MDRD: >90 ML/MIN/1.73M2
GLUCOSE SERPL-MCNC: 99 MG/DL (ref 70–99)
HCT VFR BLD AUTO: 40.2 % (ref 35–47)
HDLC SERPL-MCNC: 57 MG/DL
HGB BLD-MCNC: 12.9 G/DL (ref 11.7–15.7)
LDLC SERPL CALC-MCNC: 89 MG/DL
MCH RBC QN AUTO: 28.9 PG (ref 26.5–33)
MCHC RBC AUTO-ENTMCNC: 32.1 G/DL (ref 31.5–36.5)
MCV RBC AUTO: 90 FL (ref 78–100)
NONHDLC SERPL-MCNC: 106 MG/DL
PLATELET # BLD AUTO: 279 10E3/UL (ref 150–450)
POTASSIUM SERPL-SCNC: 3.9 MMOL/L (ref 3.4–5.3)
PROT SERPL-MCNC: 7.1 G/DL (ref 6.4–8.3)
PTH-INTACT SERPL-MCNC: 19 PG/ML (ref 15–65)
RBC # BLD AUTO: 4.46 10E6/UL (ref 3.8–5.2)
SODIUM SERPL-SCNC: 139 MMOL/L (ref 136–145)
TRIGL SERPL-MCNC: 87 MG/DL
TSH SERPL DL<=0.005 MIU/L-ACNC: 3.51 UIU/ML (ref 0.3–4.2)
VIT B12 SERPL-MCNC: 699 PG/ML (ref 232–1245)
WBC # BLD AUTO: 6.6 10E3/UL (ref 4–11)

## 2023-01-04 PROCEDURE — 85018 HEMOGLOBIN: CPT | Mod: ZL | Performed by: FAMILY MEDICINE

## 2023-01-04 PROCEDURE — 90662 IIV NO PRSV INCREASED AG IM: CPT

## 2023-01-04 PROCEDURE — 36415 COLL VENOUS BLD VENIPUNCTURE: CPT | Mod: ZL | Performed by: FAMILY MEDICINE

## 2023-01-04 PROCEDURE — 82607 VITAMIN B-12: CPT | Mod: ZL | Performed by: FAMILY MEDICINE

## 2023-01-04 PROCEDURE — 83970 ASSAY OF PARATHORMONE: CPT | Mod: ZL | Performed by: FAMILY MEDICINE

## 2023-01-04 PROCEDURE — 77067 SCR MAMMO BI INCL CAD: CPT

## 2023-01-04 PROCEDURE — 80061 LIPID PANEL: CPT | Mod: ZL | Performed by: FAMILY MEDICINE

## 2023-01-04 PROCEDURE — G0439 PPPS, SUBSEQ VISIT: HCPCS | Mod: 25 | Performed by: FAMILY MEDICINE

## 2023-01-04 PROCEDURE — 91312 COVID-19 VACCINE BIVALENT BOOSTER 12+ (PFIZER): CPT

## 2023-01-04 PROCEDURE — 84443 ASSAY THYROID STIM HORMONE: CPT | Mod: ZL | Performed by: FAMILY MEDICINE

## 2023-01-04 PROCEDURE — 82306 VITAMIN D 25 HYDROXY: CPT | Mod: ZL | Performed by: FAMILY MEDICINE

## 2023-01-04 PROCEDURE — 80053 COMPREHEN METABOLIC PANEL: CPT | Mod: ZL | Performed by: FAMILY MEDICINE

## 2023-01-04 RX ORDER — SPIRONOLACTONE 25 MG
1 TABLET ORAL DAILY
COMMUNITY

## 2023-01-04 ASSESSMENT — ENCOUNTER SYMPTOMS
EYE PAIN: 0
FREQUENCY: 0
ARTHRALGIAS: 1
JOINT SWELLING: 1
HEMATURIA: 0
NAUSEA: 0
HEARTBURN: 0
SHORTNESS OF BREATH: 0
COUGH: 1
BREAST MASS: 0
MYALGIAS: 0
CONSTIPATION: 0
DIARRHEA: 0
HEADACHES: 0
PARESTHESIAS: 0
ABDOMINAL PAIN: 0
CHILLS: 0
HEMATOCHEZIA: 0
DYSURIA: 0
DIZZINESS: 0
WEAKNESS: 0
PALPITATIONS: 0
SORE THROAT: 0
FEVER: 0

## 2023-01-04 ASSESSMENT — PAIN SCALES - GENERAL: PAINLEVEL: NO PAIN (0)

## 2023-01-04 ASSESSMENT — ACTIVITIES OF DAILY LIVING (ADL): CURRENT_FUNCTION: NO ASSISTANCE NEEDED

## 2023-01-04 NOTE — PROGRESS NOTES
"SUBJECTIVE:   Fina is a 72 year old who presents for Preventive Visit.    Patient has been advised of split billing requirements and indicates understanding: Yes  Are you in the first 12 months of your Medicare coverage?  No    Healthy Habits:     In general, how would you rate your overall health?  Good    Frequency of exercise:  4-5 days/week    Duration of exercise:  45-60 minutes    Do you usually eat at least 4 servings of fruit and vegetables a day, include whole grains    & fiber and avoid regularly eating high fat or \"junk\" foods?  Yes    Taking medications regularly:  Yes    Medication side effects:  None    Ability to successfully perform activities of daily living:  No assistance needed    Home Safety:  No safety concerns identified    Hearing Impairment:  No hearing concerns    In the past 6 months, have you been bothered by leaking of urine?  No    In general, how would you rate your overall mental or emotional health?  Good      PHQ-2 Total Score: 0    Additional concerns today:  No      Have you ever done Advance Care Planning? (For example, a Health Directive, POLST, or a discussion with a medical provider or your loved ones about your wishes): No, advance care planning information given to patient to review.  Patient plans to discuss their wishes with loved ones or provider.         Fall risk  Fallen 2 or more times in the past year?: No  Any fall with injury in the past year?: Yes    Cognitive Screening   1) Repeat 3 items (Leader, Season, Table)    2) Clock draw: NORMAL  3) 3 item recall: Recalls 3 objects  Results: 3 items recalled: COGNITIVE IMPAIRMENT LESS LIKELY    Mini-CogTM Copyright LOLIS Gar. Licensed by the author for use in [facility]; reprinted with permission ([email or web address]). All rights reserved.      Do you have sleep apnea, excessive snoring or daytime drowsiness?: wakes throughout the night    Reviewed and updated as needed this visit by clinical staff   Tobacco  " "Allergies  Meds              Reviewed and updated as needed this visit by Provider                 Social History     Tobacco Use     Smoking status: Never     Smokeless tobacco: Never   Substance Use Topics     Alcohol use: Not Currently     Comment: rare         Alcohol Use 1/4/2023   Prescreen: >3 drinks/day or >7 drinks/week? Not Applicable   Prescreen: >3 drinks/day or >7 drinks/week? -       Current providers sharing in care for this patient include:   Patient Care Team:  No Ref-Primary, Physician as PCP - Deedee Moreno MD as Assigned PCP    The following health maintenance items are reviewed in Epic and correct as of today:  Health Maintenance   Topic Date Due     ZOSTER IMMUNIZATION (1 of 2) Never done     DTAP/TDAP/TD IMMUNIZATION (1 - Tdap) Never done     MAMMO SCREENING  01/03/2024     MEDICARE ANNUAL WELLNESS VISIT  01/04/2024     FALL RISK ASSESSMENT  01/04/2024     ADVANCE CARE PLANNING  01/03/2027     COLORECTAL CANCER SCREENING  03/24/2027     LIPID  01/04/2028     DEXA  01/13/2037     PHQ-2 (once per calendar year)  Completed     INFLUENZA VACCINE  Completed     Pneumococcal Vaccine: 65+ Years  Completed     COVID-19 Vaccine  Completed     IPV IMMUNIZATION  Aged Out     MENINGITIS IMMUNIZATION  Aged Out     HEPATITIS C SCREENING  Discontinued     The 10-year ASCVD risk score (Andreina PEREZ, et al., 2019) is: 9.6%    Values used to calculate the score:      Age: 72 years      Sex: Female      Is Non- : No      Diabetic: No      Tobacco smoker: No      Systolic Blood Pressure: 118 mmHg      Is BP treated: No      HDL Cholesterol: 57 mg/dL      Total Cholesterol: 163 mg/dL        Review of Systems  Constitutional, HEENT, cardiovascular, pulmonary, gi and gu systems are negative, except as otherwise noted.    OBJECTIVE:   /62   Pulse 72   Temp 97  F (36.1  C)   Ht 1.6 m (5' 3\")   Wt 62.1 kg (137 lb)   LMP  (LMP Unknown)   SpO2 98%   BMI 24.27 kg/m   Estimated " "body mass index is 24.27 kg/m  as calculated from the following:    Height as of this encounter: 1.6 m (5' 3\").    Weight as of this encounter: 62.1 kg (137 lb).  Physical Exam  Constitutional:       Appearance: She is well-developed.   Eyes:      Conjunctiva/sclera: Conjunctivae normal.   Neck:      Thyroid: No thyromegaly.   Cardiovascular:      Rate and Rhythm: Normal rate and regular rhythm.      Heart sounds: Normal heart sounds. No murmur heard.  Pulmonary:      Effort: Pulmonary effort is normal. No respiratory distress.      Breath sounds: Normal breath sounds.   Abdominal:      Palpations: Abdomen is soft.   Lymphadenopathy:      Cervical: No cervical adenopathy.   Skin:     Findings: No rash.   Neurological:      Mental Status: She is alert and oriented to person, place, and time.         Diagnostic Test Results:  Labs reviewed in Epic    ASSESSMENT / PLAN:       ICD-10-CM    1. Health care maintenance  Z00.00       2. Crohn's disease of colon without complication (H)  K50.10 Comprehensive Metabolic Panel     CBC W PLT No Diff     Comprehensive Metabolic Panel     CBC W PLT No Diff      3. Dyslipidemia  E78.5 Lipid Panel     Lipid Panel      4. Osteoporosis, unspecified osteoporosis type, unspecified pathological fracture presence  M81.0 Vitamin D Total     PTH, Intact     Vitamin D Total     PTH, Intact      5. Paresthesia  R20.2 Vitamin B12     TSH Reflex GH     Vitamin B12     TSH Reflex GH          Labs today.  Reviewed previous DEXA scan.  Opted to repeat next year, further treatment decisions at that time.  We will check vitamin D and parathyroid hormone levels.  Mammogram later today.  Patient has colonoscopies done through [facility].  Anticipates being called soon for repeat.  Will request records.  Flu shot and COVID booster today.  Recommend shingles and Td through pharmacy.      Patient has been advised of split billing requirements and indicates understanding: Yes      COUNSELING:  Reviewed " preventive health counseling, as reflected in patient instructions        She reports that she has never smoked. She has never used smokeless tobacco.      Appropriate preventive services were discussed with this patient, including applicable screening as appropriate for cardiovascular disease, diabetes, osteopenia/osteoporosis, and glaucoma.  As appropriate for age/gender, discussed screening for colorectal cancer, prostate cancer, breast cancer, and cervical cancer. Checklist reviewing preventive services available has been given to the patient.    Reviewed patients plan of care and provided an AVS. The Basic Care Plan (routine screening as documented in Health Maintenance) for Fina meets the Care Plan requirement. This Care Plan has been established and reviewed with the Patient.          Deedee Martin MD  [facility]    Identified Health Risks:Review current opioid prescriptions    For a patient with a current opioid prescription:    Reviewed potential Opioid Use Disorder (OUD) risk factors: Yes n/a    Evaluate their pain severity and current treatment plan:     Provide information on non-opioid treatment options:    Refer to a specialist, as appropriate:    Get more information on pain management in the [facility] Pain Management Best Practices Inter-agency Task Force Report    Screen for potential Substance Use Disorders (SUDs)    Reviewed the patient s potential risk factors for SUDs: Yes n/a    Refer to treatment or specialist, as appropriate:     A screening tool isn t required but you may use one:  Find more information in the [employer] [place] on Drug Abuse Screening and Assessment Tools Chart

## 2023-01-04 NOTE — NURSING NOTE
"Chief Complaint   Patient presents with     Wellness Visit     Here for yearly wellness exam.        Initial /62   Pulse 72   Temp 97  F (36.1  C)   Ht 1.6 m (5' 3\")   Wt 62.1 kg (137 lb)   LMP  (LMP Unknown)   SpO2 98%   BMI 24.27 kg/m   Estimated body mass index is 24.27 kg/m  as calculated from the following:    Height as of this encounter: 1.6 m (5' 3\").    Weight as of this encounter: 62.1 kg (137 lb).  Medication Reconciliation: complete    Elli Hernandez LPN    Advance Care Directive reviewed    "

## 2023-01-05 LAB — DEPRECATED CALCIDIOL+CALCIFEROL SERPL-MC: 72 UG/L (ref 20–75)

## 2024-01-26 ENCOUNTER — HOSPITAL ENCOUNTER (OUTPATIENT)
Dept: MAMMOGRAPHY | Facility: OTHER | Age: 74
Discharge: HOME | End: 2024-01-26
Attending: FAMILY MEDICINE
Payer: COMMERCIAL

## 2024-01-26 ENCOUNTER — OFFICE VISIT (OUTPATIENT)
Dept: FAMILY MEDICINE | Facility: OTHER | Age: 74
End: 2024-01-26
Attending: FAMILY MEDICINE
Payer: COMMERCIAL

## 2024-01-26 VITALS
SYSTOLIC BLOOD PRESSURE: 122 MMHG | DIASTOLIC BLOOD PRESSURE: 72 MMHG | HEIGHT: 63 IN | WEIGHT: 145.4 LBS | TEMPERATURE: 97.6 F | HEART RATE: 70 BPM | BODY MASS INDEX: 25.76 KG/M2 | OXYGEN SATURATION: 97 % | RESPIRATION RATE: 16 BRPM

## 2024-01-26 DIAGNOSIS — E78.5 DYSLIPIDEMIA: Primary | ICD-10-CM

## 2024-01-26 DIAGNOSIS — E28.39 ESTROGEN DEFICIENCY: ICD-10-CM

## 2024-01-26 DIAGNOSIS — K50.10 CROHN'S DISEASE OF COLON WITHOUT COMPLICATION (H): ICD-10-CM

## 2024-01-26 DIAGNOSIS — Z12.31 VISIT FOR SCREENING MAMMOGRAM: ICD-10-CM

## 2024-01-26 LAB
ANION GAP SERPL CALCULATED.3IONS-SCNC: 8 MMOL/L (ref 7–15)
BUN SERPL-MCNC: 15.6 MG/DL (ref 8–23)
CALCIUM SERPL-MCNC: 9.7 MG/DL (ref 8.8–10.2)
CHLORIDE SERPL-SCNC: 102 MMOL/L (ref 98–107)
CHOLEST SERPL-MCNC: 180 MG/DL
CREAT SERPL-MCNC: 0.78 MG/DL (ref 0.51–0.95)
DEPRECATED HCO3 PLAS-SCNC: 29 MMOL/L (ref 22–29)
EGFRCR SERPLBLD CKD-EPI 2021: 80 ML/MIN/1.73M2
ERYTHROCYTE [DISTWIDTH] IN BLOOD BY AUTOMATED COUNT: 12.6 % (ref 10–15)
FASTING STATUS PATIENT QL REPORTED: NO
GLUCOSE SERPL-MCNC: 103 MG/DL (ref 70–99)
HCT VFR BLD AUTO: 40.6 % (ref 35–47)
HDLC SERPL-MCNC: 61 MG/DL
HGB BLD-MCNC: 13.3 G/DL (ref 11.7–15.7)
LDLC SERPL CALC-MCNC: 98 MG/DL
MCH RBC QN AUTO: 29.6 PG (ref 26.5–33)
MCHC RBC AUTO-ENTMCNC: 32.8 G/DL (ref 31.5–36.5)
MCV RBC AUTO: 90 FL (ref 78–100)
NONHDLC SERPL-MCNC: 119 MG/DL
PLATELET # BLD AUTO: 264 10E3/UL (ref 150–450)
POTASSIUM SERPL-SCNC: 4.4 MMOL/L (ref 3.4–5.3)
RBC # BLD AUTO: 4.5 10E6/UL (ref 3.8–5.2)
SODIUM SERPL-SCNC: 139 MMOL/L (ref 135–145)
TRIGL SERPL-MCNC: 107 MG/DL
WBC # BLD AUTO: 5.3 10E3/UL (ref 4–11)

## 2024-01-26 PROCEDURE — 80061 LIPID PANEL: CPT | Mod: ZL | Performed by: FAMILY MEDICINE

## 2024-01-26 PROCEDURE — 77063 BREAST TOMOSYNTHESIS BI: CPT

## 2024-01-26 PROCEDURE — 80048 BASIC METABOLIC PNL TOTAL CA: CPT | Mod: ZL | Performed by: FAMILY MEDICINE

## 2024-01-26 PROCEDURE — 36415 COLL VENOUS BLD VENIPUNCTURE: CPT | Mod: ZL | Performed by: FAMILY MEDICINE

## 2024-01-26 PROCEDURE — 85027 COMPLETE CBC AUTOMATED: CPT | Mod: ZL | Performed by: FAMILY MEDICINE

## 2024-01-26 PROCEDURE — 90662 IIV NO PRSV INCREASED AG IM: CPT

## 2024-01-26 PROCEDURE — G0439 PPPS, SUBSEQ VISIT: HCPCS | Performed by: FAMILY MEDICINE

## 2024-01-26 ASSESSMENT — ENCOUNTER SYMPTOMS
WEAKNESS: 0
PALPITATIONS: 0
NAUSEA: 0
SHORTNESS OF BREATH: 0
FREQUENCY: 1
HEMATURIA: 0
JOINT SWELLING: 1
CHILLS: 0
HEADACHES: 0
CONSTIPATION: 0
DIARRHEA: 0
ARTHRALGIAS: 1
DIZZINESS: 0
EYE PAIN: 0
FEVER: 0
DYSURIA: 0
MYALGIAS: 0
PARESTHESIAS: 0
COUGH: 0
HEARTBURN: 0
ABDOMINAL PAIN: 0
HEMATOCHEZIA: 0
NERVOUS/ANXIOUS: 0

## 2024-01-26 ASSESSMENT — ACTIVITIES OF DAILY LIVING (ADL): CURRENT_FUNCTION: NO ASSISTANCE NEEDED

## 2024-01-26 ASSESSMENT — PAIN SCALES - GENERAL: PAINLEVEL: NO PAIN (0)

## 2024-01-26 NOTE — PROGRESS NOTES
"Review current opioid prescriptions    For a patient with a current opioid prescription:    Reviewed potential Opioid Use Disorder (OUD) risk factors: Yes na    Evaluate their pain severity and current treatment plan:     Provide information on non-opioid treatment options:    Refer to a specialist, as appropriate:    Get more information on pain management in the HHS Pain Management Best Practices Inter-agency Task Force Report    Screen for potential Substance Use Disorders (SUDs)    Reviewed the patient s potential risk factors for SUDs: Yes n/aPreventive Care Visit  [facility] AND [facility]  Deedee Martin MD, Family Medicine  Jan 26, 2024      SUBJECTIVE:   Fina is a 73 year old, presenting for the following:  Wellness Visit (Here for medicare wellness visit. )        1/26/2024     1:31 PM   Additional Questions   Roomed by Elli   Accompanied by self     Are you in the first 12 months of your Medicare coverage?  No    Healthy Habits:     In general, how would you rate your overall health?  Good    Frequency of exercise:  4-5 days/week    Duration of exercise:  45-60 minutes    Do you usually eat at least 4 servings of fruit and vegetables a day, include whole grains    & fiber and avoid regularly eating high fat or \"junk\" foods?  No    Taking medications regularly:  Yes    Medication side effects:  None    Ability to successfully perform activities of daily living:  No assistance needed    Home Safety:  No safety concerns identified    Hearing Impairment:  No hearing concerns    In the past 6 months, have you been bothered by leaking of urine?  No    In general, how would you rate your overall mental or emotional health?  Good    Additional concerns today:  No      Today's PHQ-2 Score:       1/26/2024     1:06 PM   PHQ-2 ( 1999 [employer])   Q1: Little interest or pleasure in doing things 0   Q2: Feeling down, depressed or hopeless 0   PHQ-2 Score 0   Q1: Little interest or pleasure in doing things Not " at all   Q2: Feeling down, depressed or hopeless Not at all   PHQ-2 Score 0         Have you ever done Advance Care Planning? (For example, a Health Directive, POLST, or a discussion with a medical provider or your loved ones about your wishes): No, advance care planning information given to patient to review.  Patient plans to discuss their wishes with loved ones or provider.         Fall risk  Fallen 2 or more times in the past year?: No  Any fall with injury in the past year?: No    Cognitive Screening   1) Repeat 3 items (Leader, Season, Table)    2) Clock draw: ABNORMAL time is off  3) 3 item recall: Recalls 3 objects  Results: ABNORMAL clock, 1-2 items recalled: PROBABLE COGNITIVE IMPAIRMENT, **INFORM PROVIDER** (patient noted error and corrected it, no current concerns about early cognitive issues)     Mini-CogTM Copyright [employer]. Licensed by the author for use in [facility]; reprinted with permission (cheob@[facility]). All rights reserved.      Do you have sleep apnea, excessive snoring or daytime drowsiness? : Nap during the day due to early waking up.     Reviewed and updated as needed this visit by clinical staff   Tobacco  Allergies  Meds              Reviewed and updated as needed this visit by Provider                  Social History     Tobacco Use     Smoking status: Never     Smokeless tobacco: Never   Substance Use Topics     Alcohol use: Not Currently     Comment: rare             1/26/2024     1:06 PM   Alcohol Use   Prescreen: >3 drinks/day or >7 drinks/week? No     Do you have a current opioid prescription? No  Do you use any other controlled substances or medications that are not prescribed by a provider? None      Current providers sharing in care for this patient include:   Patient Care Team:  No Ref-Primary, Physician as PCP - Deedee Moreno MD as Assigned PCP    The following health maintenance items are reviewed in Epic and correct as of today:  Health Maintenance  "  Topic Date Due     ZOSTER IMMUNIZATION (1 of 2) Never done     DTAP/TDAP/TD IMMUNIZATION (1 - Tdap) Never done     RSV VACCINE (Pregnancy & 60+) (1 - 1-dose 60+ series) Never done     INFLUENZA VACCINE (1) 09/01/2023     COVID-19 Vaccine (5 - 2023-24 season) 09/01/2023     MEDICARE ANNUAL WELLNESS VISIT  01/04/2024     MAMMO SCREENING  01/04/2025     FALL RISK ASSESSMENT  01/26/2025     LIPID  01/04/2028     ADVANCE CARE PLANNING  01/04/2028     COLORECTAL CANCER SCREENING  01/08/2030     DEXA  01/13/2037     PHQ-2 (once per calendar year)  Completed     Pneumococcal Vaccine: 65+ Years  Completed     IPV IMMUNIZATION  Aged Out     HPV IMMUNIZATION  Aged Out     MENINGITIS IMMUNIZATION  Aged Out     RSV MONOCLONAL ANTIBODY  Aged Out     HEPATITIS C SCREENING  Discontinued       Review of Systems   Constitutional:  Negative for chills and fever.   HENT:  Negative for congestion, ear pain and hearing loss.    Eyes:  Negative for pain and visual disturbance.   Respiratory:  Negative for cough and shortness of breath.    Cardiovascular:  Negative for chest pain and palpitations.   Gastrointestinal:  Negative for abdominal pain, constipation, diarrhea and nausea.   Genitourinary:  Positive for frequency and urgency. Negative for dysuria, genital sores, hematuria and vaginal bleeding.   Musculoskeletal:  Positive for arthralgias and joint swelling. Negative for myalgias.   Skin:  Negative for rash.   Neurological:  Negative for dizziness, weakness and headaches.   Psychiatric/Behavioral:  The patient is not nervous/anxious.           OBJECTIVE:   /72 (BP Location: Right arm, Patient Position: Sitting, Cuff Size: Adult Regular)   Pulse 70   Temp 97.6  F (36.4  C) (Tympanic)   Resp 16   Ht 1.6 m (5' 3\")   Wt 66 kg (145 lb 6.4 oz)   LMP  (LMP Unknown)   SpO2 97%   BMI 25.76 kg/m     Estimated body mass index is 25.76 kg/m  as calculated from the following:    Height as of this encounter: 1.6 m (5' 3\").    " "Weight as of this encounter: 66 kg (145 lb 6.4 oz).  Physical Exam  Constitutional:       Appearance: She is well-developed.   HENT:      Right Ear: External ear normal.      Left Ear: External ear normal.   Eyes:      General: No scleral icterus.     Conjunctiva/sclera: Conjunctivae normal.   Neck:      Thyroid: No thyromegaly.   Cardiovascular:      Rate and Rhythm: Normal rate and regular rhythm.      Heart sounds: Normal heart sounds. No murmur heard.  Pulmonary:      Effort: Pulmonary effort is normal. No respiratory distress.      Breath sounds: Normal breath sounds.   Abdominal:      Palpations: Abdomen is soft.   Lymphadenopathy:      Cervical: No cervical adenopathy.   Skin:     Findings: No rash.   Neurological:      Mental Status: She is alert and oriented to person, place, and time.         Diagnostic Test Results:  Labs reviewed in Epic    ASSESSMENT / PLAN:       ICD-10-CM    1. Dyslipidemia  E78.5 Lipid Panel     Basic Metabolic Panel     CBC W PLT No Diff     CBC W PLT No Diff     Basic Metabolic Panel     Lipid Panel      2. Estrogen deficiency  E28.39 DX Hip/Pelvis/Spine      3. Crohn's disease of colon without complication (H)  K50.10 Adult GI  Referral - Consult Only        Labs today.   DEXA planned.   Patient is not sure when she is next due for GI follow-up.  She thinks that she was supposed to follow-up 6 months after her last colonoscopy.  She thinks her last colonoscopy was about a year ago.  Will request these records.  Referral also placed to help assist with scheduling follow-up.  Flu shot today.  Recommend Shingrix and Tdap at pharmacy.    Counseling  Reviewed preventive health counseling, as reflected in patient instructions      BMI  Estimated body mass index is 25.76 kg/m  as calculated from the following:    Height as of this encounter: 1.6 m (5' 3\").    Weight as of this encounter: 66 kg (145 lb 6.4 oz).         She reports that she has never smoked. She has never used " smokeless tobacco.      Appropriate preventive services were discussed with this patient, including applicable screening as appropriate for fall prevention, nutrition, physical activity, Tobacco-use cessation, weight loss and cognition.  Checklist reviewing preventive services available has been given to the patient.    Reviewed patients plan of care and provided an AVS. The Basic Care Plan (routine screening as documented in Health Maintenance) for Fina meets the Care Plan requirement. This Care Plan has been established and reviewed with the Patient.      Signed Electronically by: Deedee Martin MD    Identified Health Risks  I have reviewed Opioid Use Disorder and Substance Use Disorder risk factors and made any needed referrals.     Refer to treatment or specialist, as appropriate:     A screening tool isn t required but you may use one:  Find more information in the National [place] on Drug Abuse Screening and Assessment Tools Chart

## 2024-01-26 NOTE — NURSING NOTE
"Chief Complaint   Patient presents with    Wellness Visit     Here for medicare wellness visit.        Initial /72 (BP Location: Right arm, Patient Position: Sitting, Cuff Size: Adult Regular)   Pulse 70   Temp 97.6  F (36.4  C) (Tympanic)   Resp 16   Ht 1.6 m (5' 3\")   Wt 66 kg (145 lb 6.4 oz)   LMP  (LMP Unknown)   SpO2 97%   BMI 25.76 kg/m   Estimated body mass index is 25.76 kg/m  as calculated from the following:    Height as of this encounter: 1.6 m (5' 3\").    Weight as of this encounter: 66 kg (145 lb 6.4 oz).  Medication Reconciliation: complete    Elli Hernandez LPN    Advance Care Directive reviewed    "

## 2024-02-12 ENCOUNTER — HOSPITAL ENCOUNTER (OUTPATIENT)
Dept: BONE DENSITY | Facility: OTHER | Age: 74
Discharge: HOME | End: 2024-02-12
Attending: FAMILY MEDICINE | Admitting: FAMILY MEDICINE
Payer: COMMERCIAL

## 2024-02-12 DIAGNOSIS — E28.39 ESTROGEN DEFICIENCY: ICD-10-CM

## 2024-02-12 PROCEDURE — 77080 DXA BONE DENSITY AXIAL: CPT

## 2024-04-03 ENCOUNTER — TRANSFERRED RECORDS (OUTPATIENT)
Dept: HEALTH INFORMATION MANAGEMENT | Facility: OTHER | Age: 74
End: 2024-04-03

## 2024-04-03 LAB
ALT SERPL-CCNC: 10 U/L (ref 18–65)
AST SERPL-CCNC: 16 U/L (ref 10–30)
CREATININE (EXTERNAL): 0.71 MG/DL (ref 0.7–1.2)
GLUCOSE (EXTERNAL): 102 MG/DL (ref 60–99)
POTASSIUM (EXTERNAL): 4.2 MMOL/L (ref 3.5–5.1)

## 2025-01-27 ENCOUNTER — HOSPITAL ENCOUNTER (OUTPATIENT)
Dept: MAMMOGRAPHY | Facility: OTHER | Age: 75
Discharge: HOME | End: 2025-01-27
Attending: FAMILY MEDICINE | Admitting: FAMILY MEDICINE
Payer: COMMERCIAL

## 2025-01-27 DIAGNOSIS — Z12.31 VISIT FOR SCREENING MAMMOGRAM: ICD-10-CM

## 2025-01-27 PROCEDURE — 77063 BREAST TOMOSYNTHESIS BI: CPT

## 2025-03-03 RX ORDER — MESALAMINE 1000 MG/1
SUPPOSITORY RECTAL
COMMUNITY
Start: 2024-04-03 | End: 2025-03-05

## 2025-03-05 ENCOUNTER — HOSPITAL ENCOUNTER (OUTPATIENT)
Dept: GENERAL RADIOLOGY | Facility: OTHER | Age: 75
Discharge: HOME | End: 2025-03-05
Attending: FAMILY MEDICINE
Payer: COMMERCIAL

## 2025-03-05 ENCOUNTER — OFFICE VISIT (OUTPATIENT)
Dept: FAMILY MEDICINE | Facility: OTHER | Age: 75
End: 2025-03-05
Attending: FAMILY MEDICINE
Payer: COMMERCIAL

## 2025-03-05 VITALS
TEMPERATURE: 97.2 F | BODY MASS INDEX: 26.51 KG/M2 | RESPIRATION RATE: 16 BRPM | HEART RATE: 74 BPM | WEIGHT: 149.6 LBS | HEIGHT: 63 IN | DIASTOLIC BLOOD PRESSURE: 80 MMHG | SYSTOLIC BLOOD PRESSURE: 138 MMHG | OXYGEN SATURATION: 96 %

## 2025-03-05 DIAGNOSIS — K51.00 ULCERATIVE PANCOLITIS WITHOUT COMPLICATION (H): ICD-10-CM

## 2025-03-05 DIAGNOSIS — Z13.29 SCREENING FOR THYROID DISORDER: ICD-10-CM

## 2025-03-05 DIAGNOSIS — M85.89 OSTEOPENIA OF MULTIPLE SITES: ICD-10-CM

## 2025-03-05 DIAGNOSIS — Z13.1 SCREENING FOR DIABETES MELLITUS: ICD-10-CM

## 2025-03-05 DIAGNOSIS — G89.29 CHRONIC PAIN OF BOTH KNEES: ICD-10-CM

## 2025-03-05 DIAGNOSIS — M25.561 CHRONIC PAIN OF BOTH KNEES: ICD-10-CM

## 2025-03-05 DIAGNOSIS — Z13.220 LIPID SCREENING: ICD-10-CM

## 2025-03-05 DIAGNOSIS — L30.9 ECZEMA, UNSPECIFIED TYPE: ICD-10-CM

## 2025-03-05 DIAGNOSIS — M25.562 CHRONIC PAIN OF BOTH KNEES: ICD-10-CM

## 2025-03-05 DIAGNOSIS — D36.9 ADENOMATOUS POLYPS: ICD-10-CM

## 2025-03-05 DIAGNOSIS — Z00.00 ENCOUNTER FOR MEDICARE ANNUAL WELLNESS EXAM: Primary | ICD-10-CM

## 2025-03-05 DIAGNOSIS — B02.9 HERPES ZOSTER WITHOUT COMPLICATION: ICD-10-CM

## 2025-03-05 DIAGNOSIS — L65.9 HAIR LOSS: ICD-10-CM

## 2025-03-05 DIAGNOSIS — E55.9 VITAMIN D DEFICIENCY: ICD-10-CM

## 2025-03-05 DIAGNOSIS — K57.30 DIVERTICULOSIS OF COLON: ICD-10-CM

## 2025-03-05 LAB
ALBUMIN SERPL BCG-MCNC: 4.5 G/DL (ref 3.5–5.2)
ALP SERPL-CCNC: 87 U/L (ref 40–150)
ALT SERPL W P-5'-P-CCNC: 13 U/L (ref 0–50)
ANION GAP SERPL CALCULATED.3IONS-SCNC: 10 MMOL/L (ref 7–15)
AST SERPL W P-5'-P-CCNC: 14 U/L (ref 0–45)
BASOPHILS # BLD AUTO: 0.1 10E3/UL (ref 0–0.2)
BASOPHILS NFR BLD AUTO: 1 %
BILIRUB SERPL-MCNC: 0.4 MG/DL
BUN SERPL-MCNC: 18.6 MG/DL (ref 8–23)
CALCIUM SERPL-MCNC: 9.7 MG/DL (ref 8.8–10.4)
CHLORIDE SERPL-SCNC: 104 MMOL/L (ref 98–107)
CHOLEST SERPL-MCNC: 192 MG/DL
CREAT SERPL-MCNC: 0.79 MG/DL (ref 0.51–0.95)
EGFRCR SERPLBLD CKD-EPI 2021: 78 ML/MIN/1.73M2
EOSINOPHIL # BLD AUTO: 0.1 10E3/UL (ref 0–0.7)
EOSINOPHIL NFR BLD AUTO: 2 %
ERYTHROCYTE [DISTWIDTH] IN BLOOD BY AUTOMATED COUNT: 13.2 % (ref 10–15)
EST. AVERAGE GLUCOSE BLD GHB EST-MCNC: 108 MG/DL
FASTING STATUS PATIENT QL REPORTED: NO
FASTING STATUS PATIENT QL REPORTED: NO
FERRITIN SERPL-MCNC: 195 NG/ML (ref 11–328)
GLUCOSE SERPL-MCNC: 102 MG/DL (ref 70–99)
HBA1C MFR BLD: 5.4 %
HCO3 SERPL-SCNC: 27 MMOL/L (ref 22–29)
HCT VFR BLD AUTO: 42.7 % (ref 35–47)
HDLC SERPL-MCNC: 74 MG/DL
HGB BLD-MCNC: 14.1 G/DL (ref 11.7–15.7)
IMM GRANULOCYTES # BLD: 0 10E3/UL
IMM GRANULOCYTES NFR BLD: 0 %
IRON BINDING CAPACITY (ROCHE): 320 UG/DL (ref 240–430)
IRON SATN MFR SERPL: 23 % (ref 15–46)
IRON SERPL-MCNC: 72 UG/DL (ref 37–145)
LDLC SERPL CALC-MCNC: 108 MG/DL
LYMPHOCYTES # BLD AUTO: 1.2 10E3/UL (ref 0.8–5.3)
LYMPHOCYTES NFR BLD AUTO: 22 %
MCH RBC QN AUTO: 29.3 PG (ref 26.5–33)
MCHC RBC AUTO-ENTMCNC: 33 G/DL (ref 31.5–36.5)
MCV RBC AUTO: 89 FL (ref 78–100)
MONOCYTES # BLD AUTO: 0.4 10E3/UL (ref 0–1.3)
MONOCYTES NFR BLD AUTO: 8 %
NEUTROPHILS # BLD AUTO: 3.6 10E3/UL (ref 1.6–8.3)
NEUTROPHILS NFR BLD AUTO: 67 %
NONHDLC SERPL-MCNC: 118 MG/DL
NRBC # BLD AUTO: 0 10E3/UL
NRBC BLD AUTO-RTO: 0 /100
PLATELET # BLD AUTO: 297 10E3/UL (ref 150–450)
POTASSIUM SERPL-SCNC: 4.3 MMOL/L (ref 3.4–5.3)
PROT SERPL-MCNC: 7.7 G/DL (ref 6.4–8.3)
RBC # BLD AUTO: 4.82 10E6/UL (ref 3.8–5.2)
SODIUM SERPL-SCNC: 141 MMOL/L (ref 135–145)
TRIGL SERPL-MCNC: 49 MG/DL
TSH SERPL DL<=0.005 MIU/L-ACNC: 2.57 UIU/ML (ref 0.3–4.2)
VIT B12 SERPL-MCNC: 509 PG/ML (ref 232–1245)
VIT D+METAB SERPL-MCNC: 49 NG/ML (ref 20–50)
WBC # BLD AUTO: 5.4 10E3/UL (ref 4–11)

## 2025-03-05 PROCEDURE — G0463 HOSPITAL OUTPT CLINIC VISIT: HCPCS | Mod: 25 | Performed by: FAMILY MEDICINE

## 2025-03-05 PROCEDURE — 82728 ASSAY OF FERRITIN: CPT | Mod: ZL | Performed by: FAMILY MEDICINE

## 2025-03-05 PROCEDURE — 80053 COMPREHEN METABOLIC PANEL: CPT | Mod: ZL | Performed by: FAMILY MEDICINE

## 2025-03-05 PROCEDURE — 73564 X-RAY EXAM KNEE 4 OR MORE: CPT | Mod: 50

## 2025-03-05 PROCEDURE — 85025 COMPLETE CBC W/AUTO DIFF WBC: CPT | Mod: ZL | Performed by: FAMILY MEDICINE

## 2025-03-05 PROCEDURE — 84443 ASSAY THYROID STIM HORMONE: CPT | Mod: ZL | Performed by: FAMILY MEDICINE

## 2025-03-05 PROCEDURE — 84478 ASSAY OF TRIGLYCERIDES: CPT | Mod: ZL | Performed by: FAMILY MEDICINE

## 2025-03-05 PROCEDURE — 82607 VITAMIN B-12: CPT | Mod: ZL | Performed by: FAMILY MEDICINE

## 2025-03-05 PROCEDURE — 82306 VITAMIN D 25 HYDROXY: CPT | Mod: ZL | Performed by: FAMILY MEDICINE

## 2025-03-05 PROCEDURE — 83036 HEMOGLOBIN GLYCOSYLATED A1C: CPT | Mod: ZL | Performed by: FAMILY MEDICINE

## 2025-03-05 PROCEDURE — 83550 IRON BINDING TEST: CPT | Mod: ZL | Performed by: FAMILY MEDICINE

## 2025-03-05 PROCEDURE — 36415 COLL VENOUS BLD VENIPUNCTURE: CPT | Mod: ZL | Performed by: FAMILY MEDICINE

## 2025-03-05 RX ORDER — TRIAMCINOLONE ACETONIDE 1 MG/G
OINTMENT TOPICAL 3 TIMES DAILY
Qty: 15 G | Refills: 3 | Status: SHIPPED | OUTPATIENT
Start: 2025-03-05

## 2025-03-05 RX ORDER — CALCIUM CARBONATE/VITAMIN D3 600 MG-10
1 TABLET ORAL 2 TIMES DAILY
COMMUNITY

## 2025-03-05 SDOH — HEALTH STABILITY: PHYSICAL HEALTH: ON AVERAGE, HOW MANY DAYS PER WEEK DO YOU ENGAGE IN MODERATE TO STRENUOUS EXERCISE (LIKE A BRISK WALK)?: 5 DAYS

## 2025-03-05 ASSESSMENT — PAIN SCALES - GENERAL: PAINLEVEL_OUTOF10: NO PAIN (0)

## 2025-03-05 ASSESSMENT — SOCIAL DETERMINANTS OF HEALTH (SDOH): HOW OFTEN DO YOU GET TOGETHER WITH FRIENDS OR RELATIVES?: TWICE A WEEK

## 2025-03-05 NOTE — PROGRESS NOTES
Preventive Care Visit  [facility] AND [facility]  Bia Vega MD, Family Medicine  Mar 5, 2025      Assessment & Plan     (Z00.00) Encounter for Medicare annual wellness exam  (primary encounter diagnosis)  Comment: Routine health maintenance including verbal recommendation to have regular dental and eye exams, attaining/maintaining healthy weight, balanced diet, regular exercise    (K51.00) Ulcerative pancolitis without complication (H)  (D36.9) Adenomatous polyps  (K57.30) Diverticulosis of colon  Comment: Managed by GI, stable on oral mesalamine, colonoscopy scheduled next week.  Plan: Comprehensive Metabolic Panel, Iron & Iron         Binding Capacity, Ferritin, Vitamin B12,         Vitamin D Total, CBC and Differential    (L65.9) Hair loss  Comment:   Plan: TSH Reflex GH    (M25.561,  M25.562,  G89.29) Chronic pain of both knees  Comment: Highly consistent with arthritis of both knees.  Consider injection if worsening or not improving, she declines this option today.  Continue Tylenol as needed.  Recommend trial of Voltaren gel topically as needed which should not exacerbate ulcerative colitis and may help more with inflammation and pain.  May trial ice as well.  Continue to stay active.  Consider PT if this continues to worsen.  Plan: XR Knee Bilateral 3 Views    (B02.9) Herpes zoster without complication  Comment: Rash is highly consistent with zoster.  Trial triamcinolone ointment as needed for the itch.  Avoid scratching.  Recommend waiting 1 to 2 months from now before getting Shingrix vaccine, though I do strongly recommend getting vaccinated to reduce risk for recurrent zoster.    (L30.9) Eczema, unspecified type  Comment: Posterior scalp itching is likely due to eczema without evidence of seborrheic dermatitis.  May trial triamcinolone ointment as needed.  May also trial olive oil or coconut oil before bed as a moisturizer to the scalp.  Continue to use antidandruff shampoo which has helped  "some with the pruritus.  Plan: triamcinolone (KENALOG) 0.1 % external ointment    (M85.89) Osteopenia of multiple sites  (E55.9) Vitamin D deficiency  Comment: Recommend calcium and vitamin D.  Recommend switching to plain calcium with vitamin D and avoid the magnesium and zinc which can be bowel stimulating.  Recommend 500 to 600 mg calcium twice daily.  Discussed that she should likely reduce her additional vitamin D supplement 5000 units to every other day/3-4 times per week since she will be getting some vitamin D in her calcium supplement as well and her last vitamin D level was supratherapeutic.  Continue regular weightbearing exercise.  Continue DEXA every 2 years.    Plan: Vitamin D Total    (Z13.220) Lipid screening  Comment: LDL is mildly elevated at 108 with 10-year ASCVD the risk score at 16.4%.  I would recommend considering low-dose rosuvastatin.  Plan: Lipid Panel    (Z13.1) Screening for diabetes mellitus  Comment:   Plan: Hemoglobin A1c    (Z13.29) Screening for thyroid disorder  Comment:   Plan: TSH Reflex GH          BMI  Estimated body mass index is 26.5 kg/m  as calculated from the following:    Height as of this encounter: 1.6 m (5' 3\").    Weight as of this encounter: 67.9 kg (149 lb 9.6 oz).   Weight management plan: Discussed healthy diet and exercise guidelines    Counseling  Appropriate preventive services were addressed with this patient via screening, questionnaire, or discussion as appropriate for fall prevention, nutrition, physical activity, Tobacco-use cessation, social engagement, weight loss and cognition.  Checklist reviewing preventive services available has been given to the patient.  Reviewed patient's diet, addressing concerns and/or questions.   The patient was instructed to see the dentist every 6 months.         Return in about 1 year (around 3/5/2026), or if symptoms worsen or fail to improve, for AWV, Chronic Disease Management.    Subjective   Fina is a 74 year old, " presenting for the following:  Medicare Visit and Recheck Medication        3/5/2025     9:46 AM   Additional Questions   Roomed by KIKI Carey           HPI  Very pleasant 74-year-old female presents to clinic for annual wellness visit and chronic disease management.  Regarding preventative health maintenance, her last mammogram was in January 2025 and normal, continue annually.  Vaccine recommendations were thoroughly reviewed and she declines vaccines today.  She may consider getting some of them at the pharmacy.  The last colonoscopy we have record of was in 2020 though she believes that she most recently had one 2-3 years ago.  She has ulcerative colitis managed by gastroenterology at [facility].  She is scheduled for her next colonoscopy next week on March 12, 2025.  She does have a history of adenomatous polyps in the past though none on her most recent colonoscopy.  She also has diverticulosis.  Ulcerative colitis is currently fairly controlled on oral mesalamine.  She was previously on budesonide though they thought that this may have been contributing to hair loss.  She does still complain of some hair loss though it does seem to have slowed since switching from budesonide to mesalamine.  For a while she was on mesalamine suppositories but now is only taking oral mesalamine with no worsening in symptom control.  Her last DEXA was in February 2024 with osteopenia, lowest T-score -2.3.  She recently started taking a calcium supplement that also has magnesium, zinc, and vitamin D.  She does get some at least intermittent diarrhea chronically with her ulcerative colitis.  She would be agreeable to switching this to plain calcium with vitamin D, discussed that magnesium can be bowel stimulating.  She has been taking vitamin D3, 5000 units daily.  Her last vitamin D level was slightly supratherapeutic at 72.  She does have a family history of osteoporosis in both of her sisters.    She complains of pain and aching  in both knees and just below both knees, especially in the morning when she first wakes up.  She describes this as both stiffness and aching.  She suspects arthritis.  It seems to be gradually getting worse over the last several years.  The right side does seem to bother her a bit more than the left.  Sometimes she gets swelling of the right knee.  She does not feel like this is significant enough to warrant trial of a steroid injection at this point.  She generally takes Tylenol as needed.  She typically avoids oral NSAIDs due to ulcerative colitis.  She has not tried Voltaren gel though she does believe that her  has this and uses it and she would be interested in trial of it.  She stays quite active.    She developed a rash on the left side of her back just below the bra line about a month ago.  It is pruritic.  It persists with waxing and waning severity of symptoms but has not ever resolved.  She wondered if it may be shingles.  She has never had shingles before.  Her sister did have shingles more severely and it looked different than her current rash.  She also has been having itching at the posterior scalp just above the neck.  She has not noticed any dandruff or flaking.  She tried tea tree oil shampoo which seemed to help some.  She switched to a dandruff shampoo which also seems to be helping some.  She has tried over-the-counter anti-itch cream though it does not really seem to help much.  This started within the last few months.    Blood pressure is borderline today.  Home blood pressures are typically in the 120s or 130s systolic and 70s or 80s diastolic.  She has gained about 10 pounds this year, primarily due to to decreased activity.  She does not feel like her diet has changed significantly.  She denies any other concerns or complaints.           Advance Care Planning  Patient does not have a Health Care Directive: Discussed advance care planning with patient; however, patient declined at  this time.      3/5/2025   General Health   How would you rate your overall physical health? (!) FAIR   Feel stress (tense, anxious, or unable to sleep) Only a little   (!) STRESS CONCERN      3/5/2025   Nutrition   Diet: Regular (no restrictions)         3/5/2025   Exercise   Days per week of moderate/strenous exercise 5 days         3/5/2025   Social Factors   Frequency of gathering with friends or relatives Twice a week   Worry food won't last until get money to buy more No   Food not last or not have enough money for food? No   Do you have housing? (Housing is defined as stable permanent housing and does not include staying ouside in a car, in a tent, in an abandoned building, in an overnight shelter, or couch-surfing.) Yes   Are you worried about losing your housing? No   Lack of transportation? No   Unable to get utilities (heat,electricity)? No         3/5/2025   Fall Risk   Fallen 2 or more times in the past year? No   Trouble with walking or balance? Yes   Gait Speed Test (Document in seconds) 3.18   Gait Speed Test Interpretation Less than or equal to 5.00 seconds - PASS          3/5/2025   Activities of Daily Living- Home Safety   Needs help with the following daily activites None of the above   Safety concerns in the home None of the above         3/5/2025   Dental   Dentist two times every year? (!) NO         3/5/2025   Hearing Screening   Hearing concerns? None of the above         3/5/2025   Driving Risk Screening   Patient/family members have concerns about driving No         3/5/2025   General Alertness/Fatigue Screening   Have you been more tired than usual lately? No         3/5/2025   Urinary Incontinence Screening   Bothered by leaking urine in past 6 months No            Today's PHQ-2 Score:       3/5/2025     9:47 AM   PHQ-2 ( 1999 Pf[employer])   Q1: Little interest or pleasure in doing things 0   Q2: Feeling down, depressed or hopeless 0   PHQ-2 Score 0    Q1: Little interest or pleasure in doing  things Not at all   Q2: Feeling down, depressed or hopeless Not at all   PHQ-2 Score 0       Patient-reported           3/5/2025   Substance Use   Alcohol more than 3/day or more than 7/wk No   Do you have a current opioid prescription? No   How severe/bad is pain from 1 to 10? 0/10 (No Pain)   Do you use any other substances recreationally? No     Social History     Tobacco Use    Smoking status: Never    Smokeless tobacco: Never   Vaping Use    Vaping status: Never Used   Substance Use Topics    Alcohol use: Not Currently     Comment: rare    Drug use: No           1/27/2025   LAST FHS-7 RESULTS   1st degree relative breast or ovarian cancer No   Any relative bilateral breast cancer No   Any male have breast cancer No   Any ONE woman have BOTH breast AND ovarian cancer No   Any woman with breast cancer before 50yrs No   2 or more relatives with breast AND/OR ovarian cancer No   2 or more relatives with breast AND/OR bowel cancer No        Mammogram Screening - Mammogram every 1-2 years updated in Health Maintenance based on mutual decision making    ASCVD Risk   The 10-year ASCVD risk score (Andreina PEREZ, et al., 2019) is: 16.4%    Values used to calculate the score:      Age: 74 years      Sex: Female      Is Non- : No      Diabetic: No      Tobacco smoker: No      Systolic Blood Pressure: 138 mmHg      Is BP treated: No      HDL Cholesterol: 74 mg/dL      Total Cholesterol: 192 mg/dL            Reviewed and updated as needed this visit by Provider     Meds  Problems  Med Hx  Surg Hx    Sexual Activity          Past Medical History:   Diagnosis Date    Arthritis     Chronic pain     Noninfectious ileitis     PONV (postoperative nausea and vomiting)     ALSO SLOW TO WAKE UP    Urinary incontinence with continuous leakage     Vaginal erosion due to surgical mesh     Vaginal vault prolapse      Past Surgical History:   Procedure Laterality Date    COLONOSCOPY  04/29/2013     Procedure: COLONOSCOPY;  COLONOSCOPY with bx;  Surgeon: Shannan Garcia DO;  [facility]: [facility]    COLONOSCOPY N/A 2017    Procedure: COLONOSCOPY;  Surgeon: Ervin Hope MD;  Location: [facility]    COLONOSCOPY  2017    EXAMCOL   [facility]    ENDOSCOPY UPPER, COLONOSCOPY, COMBINED N/A 10/29/2015    Procedure: COMBINED ENDOSCOPY UPPER, COLONOSCOPY;  Surgeon: Ervin Hope MD;  Location: [facility]    ENT SURGERY      tonsillectomy    GYN SURGERY  ,    hysterectomy, prolapse repair    GYN SURGERY       x 2    GYN SURGERY  2014    pelvic floor reconstruction, [facility]    HEAD & NECK SURGERY      jaw surgey    HERNIA REPAIR       Patient Active Problem List   Diagnosis    Vaginal vault prolapse    Urinary incontinence with continuous leakage    Vaginal erosion due to surgical mesh    Adenomatous polyps    Diverticulosis of colon    Ulcerative pancolitis without complication (H)    AK (actinic keratosis)    Muscle cramps    Hair loss    Rectocele    Osteoarthritis    Metatarsalgia    Cystocele    Chronic pain of both knees    Osteopenia of multiple sites    Herpes zoster without complication    Vitamin D deficiency    Eczema, unspecified type     Past Surgical History:   Procedure Laterality Date    COLONOSCOPY  2013    Procedure: COLONOSCOPY;  COLONOSCOPY with bx;  Surgeon: Shannan Garcia DO;  [facility]    COLONOSCOPY N/A 2017    Procedure: COLONOSCOPY;  Surgeon: Ervin Hope MD;  [facility]: [facility]    COLONOSCOPY  2017    EXAMCOL   [facility]    ENDOSCOPY UPPER, COLONOSCOPY, COMBINED N/A 10/29/2015    Procedure: COMBINED ENDOSCOPY UPPER, COLONOSCOPY;  Surgeon: Ervin Hope MD;  Location: [facility]    ENT SURGERY      tonsillectomy    GYN SURGERY  ,    hysterectomy, prolapse repair    GYN SURGERY       x 2    GYN SURGERY  2014    pelvic floor reconstruction, [facility]    HEAD & NECK SURGERY      jaw surgey    HERNIA REPAIR         Social History     Tobacco Use     Smoking status: Never    Smokeless tobacco: Never   Substance Use Topics    Alcohol use: Not Currently     Comment: rare     Family History   Problem Relation Age of Onset    Hypertension Mother     Lipids Mother     Cancer Mother         skin    Respiratory Father         copd    Cancer Father         skin    Diverticulitis Sister     Hypertension Brother     Anesthesia Reaction No family hx of     Malignant Hyperthermia No family hx of          Current Outpatient Medications   Medication Sig Dispense Refill    Biotin 5000 MCG TABS Take 1 tablet by mouth daily 90 tablet 1    calcium carbonate-vitamin D (CALTRATE) 600-10 MG-MCG per tablet Take 1 tablet by mouth 2 times daily.      Lutein 20 MG CAPS Take 1 capsule by mouth daily      mesalamine (LIALDA) 1.2 g EC tablet       omeprazole (PRILOSEC) 40 MG DR capsule       Prenatal Vit-Fe Fumarate-FA (PRENATAL/FOLIC ACID) TABS Take 1 tablet by mouth daily      Probiotic Product (PROBIOTIC DAILY PO) Take 1 tablet by mouth daily      Psyllium (METAMUCIL PO) Take by mouth daily      triamcinolone (KENALOG) 0.1 % external ointment Apply topically 3 times daily. 15 g 3    Vitamin D3 (CHOLECALCIFEROL) 125 MCG (5000 UT) tablet Take 125 mcg by mouth daily       Allergies   Allergen Reactions    Penicillins      Current providers sharing in care for this patient include:  Patient Care Team:  No Ref-Primary, Physician as PCP - Deedee Moreno MD as Assigned PCP    The following health maintenance items are reviewed in Epic and correct as of today:  Health Maintenance   Topic Date Due    ZOSTER IMMUNIZATION (1 of 2) Never done    DTAP/TDAP/TD IMMUNIZATION (1 - Tdap) Never done    RSV VACCINE (1 - Risk 60-74 years 1-dose series) Never done    INFLUENZA VACCINE (1) 09/01/2024    COVID-19 Vaccine (5 - 2024-25 season) 09/01/2024    MEDICARE ANNUAL WELLNESS VISIT  03/05/2026    FALL RISK ASSESSMENT  03/05/2026    MAMMO SCREENING  01/27/2027    GLUCOSE  03/05/2028    COLORECTAL  "CANCER SCREENING  01/08/2030    LIPID  03/05/2030    ADVANCE CARE PLANNING  03/05/2030    DEXA  02/12/2039    PHQ-2 (once per calendar year)  Completed    Pneumococcal Vaccine: 50+ Years  Completed    HPV IMMUNIZATION  Aged Out    MENINGITIS IMMUNIZATION  Aged Out    HEPATITIS C SCREENING  Discontinued         Review of Systems  Pertinent positives and negatives as per HPI, otherwise negative.       Objective    Exam  /80   Pulse 74   Temp 97.2  F (36.2  C) (Tympanic)   Resp 16   Ht 1.6 m (5' 3\")   Wt 67.9 kg (149 lb 9.6 oz)   LMP  (LMP Unknown)   SpO2 96%   Breastfeeding No   BMI 26.50 kg/m     Estimated body mass index is 26.5 kg/m  as calculated from the following:    Height as of this encounter: 1.6 m (5' 3\").    Weight as of this encounter: 67.9 kg (149 lb 9.6 oz).    Physical Exam    General: alert and oriented x 3, no acute distress, pleasant, conversant  Head: normocephalic, atraumatic  Ears: tympanic membranes pearly gray, canals clear  Eyes: pupils equal, round, and reactive to light, conjunctiva pink, sclera white  Oropharynx: pink without tonsillar enlargement nor exudate  Neck: supple without lymphadenopathy, thyroid not enlarged, no JVD or carotid bruits  Lungs: clear to auscultation, no wheezes, rhonchi or rales, no increased work of breathing  Heart: regular rate and rhythm, no murmurs auscultated  Abdomen: soft, nontender, nondistended, normoactive bowel sounds, no palpable masses or organomegaly  Skin: left back just below the bra line with slightly excoriated slightly erythematous rash in a dermatomal distribution that does not cross midline highly suspicious for resolving zoster, posterior scalp at the inferior hairline with some dryness but without flaking nor erythema, no obvious seborrheic dermatitis, no other abnormal lesions or rash  Musculoskeletal/Extremities: good ROM throughout, no peripheral edema  Neuro: no gross focal deficits  Psych: mood good, affect congruent, denies " SI    Colonoscopy scheduled next week  Pap Smear not indicated for screening purposes based on age  Mammogram due January 2026  Dexa scan due February 2026  Labs pending  Influenza recommended annually  PCV done  Pneumovax done  Shingrix may consider getting this at the pharmacy when her current shingles rash is resolved  Tdap/Td recommended at the pharmacy where it will be covered by insurance  COVID declined  RSV recommended at the pharmacy where it will be covered by insurance        3/5/2025   Mini Cog   Clock Draw Score 2 Normal   3 Item Recall 3 objects recalled   Mini Cog Total Score 5              I spent a total of 64 minutes on the patient's care today including preparing for the visit, the visit, documentation, and follow-up care.  More than 35 minutes were spent addressing new concerns and chronic medical problems.  This does not include any procedure time.    The longitudinal plan of care for the diagnosis(es)/condition(s) as documented were addressed during this visit. Due to the added complexity in care, I will continue to support Fina in the subsequent management and with ongoing continuity of care.    Signed Electronically by: Bia Vega MD

## 2025-03-05 NOTE — LETTER
March 5, 2025      Fina Matt  [address]  [place] [address]        Dear ,    We are writing to inform you of your test results.  Overall your labs look pretty good.  Electrolytes, kidney function, and liver enzymes are normal.  Your glucose was very mildly elevated but A1c is normal meaning you do not have diabetes or prediabetes.  Iron labs are normal.  Vitamin B12 level is normal.  Thyroid function is normal.  CBC is normal.  Your LDL cholesterol is mildly elevated. Current guidelines recommend considering a statin (cholesterol lowering medication) if your 10 year ASCVD risk score (calculated below) is above 5% and definitely recommend starting medication if it is above 7.5%.  The risk score estimates your risk of having a major cardiovascular event such as a heart attack, stroke, or sudden cardiac death in the next 10 years.  As you can see, your risk score is 16.4% and therefore I would recommend starting low-dose rosuvastatin (generic crestor).  If you are willing to start this medication, please let me know.  I usually recommend rechecking labs 2 months after starting the medication and I would place lab orders so that you can just stop in to the clinic to have them drawn at your convenience in 2 months.  You will need to schedule an outpatient lab appointment for this.    The 10-year ASCVD risk score (Andreina PEREZ, et al., 2019) is: 16.4%    Values used to calculate the score:      Age: 74 years      Sex: Female      Is Non- : No      Diabetic: No      Tobacco smoker: No      Systolic Blood Pressure: 138 mmHg      Is BP treated: No      HDL Cholesterol: 74 mg/dL      Total Cholesterol: 192 mg/dL    Resulted Orders   Lipid Panel   Result Value Ref Range    Cholesterol 192 <200 mg/dL    Triglycerides 49 <150 mg/dL    Direct Measure HDL 74 >=50 mg/dL    LDL Cholesterol Calculated 108 (H) <100 mg/dL    Non HDL Cholesterol 118 <130 mg/dL    Patient Fasting > 8hrs? No        Comment:      1 piece of chocolate  1 piece of chocolate  1 piece of chocolate    Narrative    Cholesterol  Desirable: < 200 mg/dL  Borderline High: 200 - 239 mg/dL  High: >= 240 mg/dL    Triglycerides  Normal: < 150 mg/dL  Borderline High: 150 - 199 mg/dL  High: 200-499 mg/dL  Very High: >= 500 mg/dL    Direct Measure HDL  Female: >= 50 mg/dL   Male: >= 40 mg/dL    LDL Cholesterol  Desirable: < 100 mg/dL  Above Desirable: 100 - 129 mg/dL   Borderline High: 130 - 159 mg/dL   High:  160 - 189 mg/dL   Very High: >= 190 mg/dL    Non HDL Cholesterol  Desirable: < 130 mg/dL  Above Desirable: 130 - 159 mg/dL  Borderline High: 160 - 189 mg/dL  High: 190 - 219 mg/dL  Very High: >= 220 mg/dL   Comprehensive Metabolic Panel   Result Value Ref Range    Sodium 141 135 - 145 mmol/L    Potassium 4.3 3.4 - 5.3 mmol/L    Carbon Dioxide (CO2) 27 22 - 29 mmol/L    Anion Gap 10 7 - 15 mmol/L    Urea Nitrogen 18.6 8.0 - 23.0 mg/dL    Creatinine 0.79 0.51 - 0.95 mg/dL    GFR Estimate 78 >60 mL/min/1.73m2      Comment:      eGFR calculated using 2021 CKD-EPI equation.    Calcium 9.7 8.8 - 10.4 mg/dL    Chloride 104 98 - 107 mmol/L    Glucose 102 (H) 70 - 99 mg/dL    Alkaline Phosphatase 87 40 - 150 U/L    AST 14 0 - 45 U/L    ALT 13 0 - 50 U/L    Protein Total 7.7 6.4 - 8.3 g/dL    Albumin 4.5 3.5 - 5.2 g/dL    Bilirubin Total 0.4 <=1.2 mg/dL    Patient Fasting > 8hrs? No       Comment:      1 piece of chocolate   Iron & Iron Binding Capacity   Result Value Ref Range    Iron 72 37 - 145 ug/dL    Iron Binding Capacity 320 240 - 430 ug/dL    Iron Sat Index 23 15 - 46 %   Ferritin   Result Value Ref Range    Ferritin 195 11 - 328 ng/mL   Vitamin B12   Result Value Ref Range    Vitamin B12 509 232 - 1,245 pg/mL   TSH Reflex GH   Result Value Ref Range    TSH 2.57 0.30 - 4.20 uIU/mL   Hemoglobin A1c   Result Value Ref Range    Estimated Average Glucose 108 <117 mg/dL    Hemoglobin A1C 5.4 <5.7 %      Comment:      Normal <5.7%   Prediabetes  5.7-6.4%    Diabetes 6.5% or higher     Note: Adopted from ADA consensus guidelines.   CBC with platelets and differential   Result Value Ref Range    WBC Count 5.4 4.0 - 11.0 10e3/uL    RBC Count 4.82 3.80 - 5.20 10e6/uL    Hemoglobin 14.1 11.7 - 15.7 g/dL    Hematocrit 42.7 35.0 - 47.0 %    MCV 89 78 - 100 fL    MCH 29.3 26.5 - 33.0 pg    MCHC 33.0 31.5 - 36.5 g/dL    RDW 13.2 10.0 - 15.0 %    Platelet Count 297 150 - 450 10e3/uL    % Neutrophils 67 %    % Lymphocytes 22 %    % Monocytes 8 %    % Eosinophils 2 %    % Basophils 1 %    % Immature Granulocytes 0 %    NRBCs per 100 WBC 0 <1 /100    Absolute Neutrophils 3.6 1.6 - 8.3 10e3/uL    Absolute Lymphocytes 1.2 0.8 - 5.3 10e3/uL    Absolute Monocytes 0.4 0.0 - 1.3 10e3/uL    Absolute Eosinophils 0.1 0.0 - 0.7 10e3/uL    Absolute Basophils 0.1 0.0 - 0.2 10e3/uL    Absolute Immature Granulocytes 0.0 <=0.4 10e3/uL    Absolute NRBCs 0.0 10e3/uL       If you have any questions or concerns, please call the clinic at the number listed above.       Sincerely,      Bia Vega MD    Electronically signed

## 2025-03-05 NOTE — NURSING NOTE
"Chief Complaint   Patient presents with    Medicare Visit    Recheck Medication       Initial /80   Pulse 74   Temp 97.2  F (36.2  C) (Tympanic)   Resp 16   Ht 1.6 m (5' 3\")   Wt 67.9 kg (149 lb 9.6 oz)   LMP  (LMP Unknown)   SpO2 96%   Breastfeeding No   BMI 26.50 kg/m   Estimated body mass index is 26.5 kg/m  as calculated from the following:    Height as of this encounter: 1.6 m (5' 3\").    Weight as of this encounter: 67.9 kg (149 lb 9.6 oz).  Medication Review: complete    The next two questions are to help us understand your food security.  If you are feeling you need any assistance in this area, we have resources available to support you today.          3/5/2025   SDOH- Food Insecurity   Within the past 12 months, did you worry that your food would run out before you got money to buy more? N   Within the past 12 months, did the food you bought just not last and you didn t have money to get more? N         Health Care Directive:  Patient does not have a Health Care Directive: Discussed advance care planning with patient; however, patient declined at this time.    Norma J. Gosselin, LPN      "

## 2025-03-05 NOTE — PATIENT INSTRUCTIONS
Change your calcium supplement to plain calcium and vitamin D as magnesium can be stimulating to the bowels and worsen diarrhea.  Calcium supplement 500-600 mg of calcium per tablet ideally twice daily for a total of 4893-4181 mg daily.  The vitamin D usually ranges from 400-1000 units per tablet so you will likely need less of the additional vitamin D (reduce the 5000 unit tablet to every other day or 3-4 days per week).    Repeat dexa (bone density) next year (every 2 years).    Recommend Tdap (Tetanus vaccine), Shingrix vaccine (2nd dose 2-6 months after the first ideally) and RSV vaccines at the pharmacy where they are covered by insurance.  There should be no copay with all of these as long as you get them at the pharmacy.    Try voltaren gel up to every 6 hours as needed for knee/joint pain, can help with inflammation and pain.    Try triamcinolone ointment up to 3 times daily as needed for itching of scalp or back.    Wait another 1-2 months before getting Shingrix vaccine    Continue anti-dandruff shampoo but I suspect itch its primarily due to dryness.  Coconut oil or olive oil before bed can help as well, wash out in the morning.    Patient Education   Preventive Care Advice   This is general advice given by our system to help you stay healthy. However, your care team may have specific advice just for you. Please talk to your care team about your preventive care needs.  Nutrition  Eat 5 or more servings of fruits and vegetables each day.  Try wheat bread, brown rice and whole grain pasta (instead of white bread, rice, and pasta).  Get enough calcium and vitamin D. Check the label on foods and aim for 100% of the RDA (recommended daily allowance).  Lifestyle  Exercise at least 150 minutes each week  (30 minutes a day, 5 days a week).  Do muscle strengthening activities 2 days a week. These help control your weight and prevent disease.  No smoking.  Wear sunscreen to prevent skin cancer.  Have a dental exam  and cleaning every 6 months.  Yearly exams  See your health care team every year to talk about:  Any changes in your health.  Any medicines your care team has prescribed.  Preventive care, family planning, and ways to prevent chronic diseases.  Shots (vaccines)   HPV shots (up to age 26), if you've never had them before.  Hepatitis B shots (up to age 59), if you've never had them before.  COVID-19 shot: Get this shot when it's due.  Flu shot: Get a flu shot every year.  Tetanus shot: Get a tetanus shot every 10 years.  Pneumococcal, hepatitis A, and RSV shots: Ask your care team if you need these based on your risk.  Shingles shot (for age 50 and up)  General health tests  Diabetes screening:  Starting at age 35, Get screened for diabetes at least every 3 years.  If you are younger than age 35, ask your care team if you should be screened for diabetes.  Cholesterol test: At age 39, start having a cholesterol test every 5 years, or more often if advised.  Bone density scan (DEXA): At age 50, ask your care team if you should have this scan for osteoporosis (brittle bones).  Hepatitis C: Get tested at least once in your life.  STIs (sexually transmitted infections)  Before age 24: Ask your care team if you should be screened for STIs.  After age 24: Get screened for STIs if you're at risk. You are at risk for STIs (including HIV) if:  You are sexually active with more than one person.  You don't use condoms every time.  You or a partner was diagnosed with a sexually transmitted infection.  If you are at risk for HIV, ask about PrEP medicine to prevent HIV.  Get tested for HIV at least once in your life, whether you are at risk for HIV or not.  Cancer screening tests  Cervical cancer screening: If you have a cervix, begin getting regular cervical cancer screening tests starting at age 21.  Breast cancer scan (mammogram): If you've ever had breasts, begin having regular mammograms starting at age 40. This is a scan to  check for breast cancer.  Colon cancer screening: It is important to start screening for colon cancer at age 45.  Have a colonoscopy test every 10 years (or more often if you're at risk) Or, ask your provider about stool tests like a FIT test every year or Cologuard test every 3 years.  To learn more about your testing options, visit:   .  For help making a decision, visit:   https://bit.ly/yo15132.  Prostate cancer screening test: If you have a prostate, ask your care team if a prostate cancer screening test (PSA) at age 55 is right for you.  Lung cancer screening: If you are a current or former smoker ages 50 to 80, ask your care team if ongoing lung cancer screenings are right for you.  For informational purposes only. Not to replace the advice of your health care provider. Copyright   2023 [facility]. All rights reserved. Clinically reviewed by the [facility]itions [facility]. [employer] [id number] - REV 01/24.  Eating Healthy Foods: Care Instructions  With every meal, you can make healthy food choices. Try to eat a variety of fruits, vegetables, whole grains, lean proteins, and low-fat dairy products. This can help you get the right balance of nutrients, including vitamins and minerals. Small changes add up over time. You can start by adding one healthy food to your meals each day.    Try to make half your plate fruits and vegetables, one-fourth whole grains, and one-fourth lean proteins. Try including dairy with your meals.   Eat more fruits and vegetables. Try to have them with most meals and snacks.   Foods for healthy eating        Fruits   These can be fresh, frozen, canned, or dried.  Try to choose whole fruit rather than fruit juice.  Eat a variety of colors.        Vegetables   These can be fresh, frozen, canned, or dried.  Beans, peas, and lentils count too.        Whole grains   Choose whole-grain breads, cereals, and noodles.  Try brown rice.        Lean proteins   These can  "include lean meat, poultry, fish, and eggs.  You can also have tofu, beans, peas, lentils, nuts, and seeds.        Dairy   Try milk, yogurt, and cheese.  Choose low-fat or fat-free when you can.  If you need to, use lactose-free milk or fortified plant-based milk products, such as soy milk.        Water   Drink water when you're thirsty.  Limit sugar-sweetened drinks, including soda, fruit drinks, and sports drinks.  Where can you learn more?  Go to https://www.[employer].net/patiented  Enter T756 in the search box to learn more about \"Eating Healthy Foods: Care Instructions.\"  Current as of: September 20, 2023  Content Version: 14.3    2024 [employer].   Care instructions adapted under license by your healthcare professional. If you have questions about a medical condition or this instruction, always ask your healthcare professional. [employer] disclaims any warranty or liability for your use of this information.    Preventing Falls: Care Instructions  Injuries and health problems such as trouble walking or poor eyesight can increase your risk of falling. So can some medicines. But there are things you can do to help prevent falls. You can exercise to get stronger. You can also arrange your home to make it safer.    Talk to your doctor about the medicines you take. Ask if any of them increase the risk of falls and whether they can be changed or stopped.   Try to exercise regularly. It can help improve your strength and balance. This can help lower your risk of falling.         Practice fall safety and prevention.   Wear low-heeled shoes that fit well and give your feet good support. Talk to your doctor if you have foot problems that make this hard.  Carry a cellphone or wear a medical alert device that you can use to call for help.  Use stepladders instead of chairs to reach high objects. Don't climb if you're at risk for falls. Ask for help, if needed.  Wear the correct eyeglasses, if you " "need them.        Make your home safer.   Remove rugs, cords, clutter, and furniture from walkways.  Keep your house well lit. Use night-lights in hallways and bathrooms.  Install and use sturdy handrails on stairways.  Wear nonskid footwear, even inside. Don't walk barefoot or in socks without shoes.        Be safe outside.   Use handrails, curb cuts, and ramps whenever possible.  Keep your hands free by using a shoulder bag or backpack.  Try to walk in well-lit areas. Watch out for uneven ground, changes in pavement, and debris.  Be careful in the winter. Walk on the grass or gravel when sidewalks are slippery. Use de-icer on steps and walkways. Add non-slip devices to shoes.    Put grab bars and nonskid mats in your shower or tub and near the toilet. Try to use a shower chair or bath bench when bathing.   Get into a tub or shower by putting in your weaker leg first. Get out with your strong side first. Have a phone or medical alert device in the bathroom with you.   Where can you learn more?  Go to https://www.[employer].net/patiented  Enter G117 in the search box to learn more about \"Preventing Falls: Care Instructions.\"  Current as of: July 31, 2024  Content Version: 14.3    2024 [employer].   Care instructions adapted under license by your healthcare professional. If you have questions about a medical condition or this instruction, always ask your healthcare professional. [employer] disclaims any warranty or liability for your use of this information.       "

## 2025-03-07 ENCOUNTER — TELEPHONE (OUTPATIENT)
Dept: FAMILY MEDICINE | Facility: OTHER | Age: 75
End: 2025-03-07
Payer: COMMERCIAL

## 2025-03-07 NOTE — TELEPHONE ENCOUNTER
Patient was advised of lab results and imaging results as indicated in chart per Mychart notes/letter. After proper verification,patient was notified, and verbalized understanding of all results given from provider. At this time, she is hesitant to start a cholesterol medication, despite reccommended and where she is at currently. She would like to think this over, and she will call us back either way to let us know. She is aware that diet and exercise will help levels, but may also need additional medication to help with levels further per her score.  She is also requesting a letter to be mailed to her with all results and providers recommendations, as she is not able to get into her Mychart at this time. I did ask if she wanted this deactivated, she does not at this time. Message ok for provider's return per patient, but patient also advised that if she wants to start the med sooner please let us know.   Joanne Elena, LPN on 3/7/2025 at 11:49 AM

## 2025-03-11 NOTE — TELEPHONE ENCOUNTER
Pt states that she did receive the letter. The only lab result that was not included on that was the vitamin d level.  I went over this result with her.  Shira Cain LPN on 3/11/2025 at 2:52 PM

## 2025-04-22 ENCOUNTER — OFFICE VISIT (OUTPATIENT)
Dept: FAMILY MEDICINE | Facility: OTHER | Age: 75
End: 2025-04-22
Attending: NURSE PRACTITIONER
Payer: COMMERCIAL

## 2025-04-22 VITALS
WEIGHT: 155 LBS | DIASTOLIC BLOOD PRESSURE: 80 MMHG | TEMPERATURE: 98.3 F | SYSTOLIC BLOOD PRESSURE: 126 MMHG | RESPIRATION RATE: 20 BRPM | HEART RATE: 79 BPM | BODY MASS INDEX: 27.46 KG/M2 | OXYGEN SATURATION: 98 % | HEIGHT: 63 IN

## 2025-04-22 DIAGNOSIS — Z23 NEED FOR VACCINATION: ICD-10-CM

## 2025-04-22 DIAGNOSIS — S61.210A LACERATION OF RIGHT INDEX FINGER, INITIAL ENCOUNTER: Primary | ICD-10-CM

## 2025-04-22 PROCEDURE — G0463 HOSPITAL OUTPT CLINIC VISIT: HCPCS

## 2025-04-22 PROCEDURE — 12001 RPR S/N/AX/GEN/TRNK 2.5CM/<: CPT | Performed by: NURSE PRACTITIONER

## 2025-04-22 PROCEDURE — 90471 IMMUNIZATION ADMIN: CPT

## 2025-04-22 PROCEDURE — 250N000009 HC RX 250: Mod: JW | Performed by: NURSE PRACTITIONER

## 2025-04-22 RX ORDER — LIDOCAINE HYDROCHLORIDE 10 MG/ML
3 INJECTION, SOLUTION EPIDURAL; INFILTRATION; INTRACAUDAL; PERINEURAL ONCE
Status: COMPLETED | OUTPATIENT
Start: 2025-04-22 | End: 2025-04-22

## 2025-04-22 RX ORDER — ACETAMINOPHEN AND CODEINE PHOSPHATE 300; 30 MG/1; MG/1
1 TABLET ORAL EVERY 6 HOURS PRN
Qty: 6 TABLET | Refills: 0 | Status: SHIPPED | OUTPATIENT
Start: 2025-04-22 | End: 2025-04-24

## 2025-04-22 RX ORDER — LIDOCAINE HYDROCHLORIDE 10 MG/ML
4 INJECTION, SOLUTION EPIDURAL; INFILTRATION; INTRACAUDAL; PERINEURAL ONCE
Status: DISCONTINUED | OUTPATIENT
Start: 2025-04-22 | End: 2025-04-22

## 2025-04-22 RX ADMIN — LIDOCAINE HYDROCHLORIDE 3 ML: 10 INJECTION, SOLUTION EPIDURAL; INFILTRATION; INTRACAUDAL; PERINEURAL at 18:36

## 2025-04-22 ASSESSMENT — PAIN SCALES - GENERAL: PAINLEVEL_OUTOF10: NO PAIN (0)

## 2025-04-22 NOTE — PATIENT INSTRUCTIONS
1. Keep stitches absolutely dry for first 24 hours, then you may wash and shower as you normally would.   2. Avoid soaking stitches as this can slow down healing and raise your chance of getting an infection.   3. After 24 hours you may remove the dressing, replace bandage with each washing.  4. Recommend washing frequently with soapy water.  Pat dry.  5. Use Tylenol or Ibuprofen for comfort.  6. Watch for signs of infection such as:    increased pain after 24 hours   Increased temperature   Redness or swelling   Yellow or greenish discharge   Foul odor  7.  Tetanus updated today, next due in 10 years  8. Return to clinic if any of the above signs are present  9.  Return to clinic in 10 -12 days for suture removal

## 2025-04-22 NOTE — NURSING NOTE
"Chief Complaint   Patient presents with    Laceration     Right pointer finger     Patient here for laceration of right pointer finger from this afternoon after cutting on wood splitter. Has washed in cold water.     Initial /80   Pulse 79   Temp 98.3  F (36.8  C) (Tympanic)   Resp 20   Ht 1.6 m (5' 3\")   Wt 70.3 kg (155 lb)   LMP  (LMP Unknown)   SpO2 98%   BMI 27.46 kg/m   Estimated body mass index is 27.46 kg/m  as calculated from the following:    Height as of this encounter: 1.6 m (5' 3\").    Weight as of this encounter: 70.3 kg (155 lb).  Medication Review: complete    The next two questions are to help us understand your food security.  If you are feeling you need any assistance in this area, we have resources available to support you today.          4/22/2025   SDOH- Food Insecurity   Within the past 12 months, did you worry that your food would run out before you got money to buy more? N   Within the past 12 months, did the food you bought just not last and you didn t have money to get more? N         Health Care Directive:  Patient does not have a Health Care Directive: Discussed advance care planning with patient; however, patient declined at this time.    Angelique Cade LPN      "

## 2025-04-22 NOTE — PROGRESS NOTES
ASSESSMENT/PLAN:     I have reviewed the nursing notes.  I have reviewed the findings, diagnosis, plan and need for follow up with the patient.        1. Laceration of right index finger, initial encounter (Primary)  2. Need for vaccination  - TDAP 7+ (ADACEL,BOOSTRIX)  - lidocaine (PF) (XYLOCAINE) 1 % injection 3 mL  - REPAIR SUPERFICIAL, WOUND BODY < =2.5CM  - acetaminophen-codeine (TYLENOL #3) 300-30 MG per tablet; Take 1 tablet by mouth every 6 hours as needed for severe pain.  Dispense: 6 tablet; Refill: 0    1. Keep stitches absolutely dry for first 24 hours, then you may wash and shower as you normally would.   2. Avoid soaking stitches as this can slow down healing and raise your chance of getting an infection.   3. After 24 hours you may remove the dressing, replace bandage with each washing.  4. Recommend washing frequently with soapy water.  Pat dry.  5. Use Tylenol or Ibuprofen for comfort.  6. Watch for signs of infection such as:    increased pain after 24 hours   Increased temperature   Redness or swelling   Yellow or greenish discharge   Foul odor  7.  Tetanus updated today, next due in 10 years  8. Return to clinic if any of the above signs are present  9.  Return to clinic in 10 -12 days for suture removal                 I explained my diagnostic considerations and recommendations to the patient, who voiced understanding and agreement with the treatment plan. All questions were answered. We discussed potential side effects of any prescribed or recommended therapies, as well as expectations for response to treatments.    Berna Johnston NP  [facility]      SUBJECTIVE:   Fina Matt is a 74 year old female who presents to clinic today for the following health issues:  Finger laceration    HPI  Right pointer finger with multiple lacerations occurred this afternoon from a wood splitter.  Some throbbing pain.  No numbness or tingling.  Lots of bruising.  Bleeding  controlled.   States she is able to move the finger and denies concerns for fracture.  Right hand dominant.  No anticoagulant use.   Washed at home with cold water.  No documented tetanus.          Past Medical History:   Diagnosis Date    Arthritis     Chronic pain     Noninfectious ileitis     PONV (postoperative nausea and vomiting)     ALSO SLOW TO WAKE UP    Urinary incontinence with continuous leakage     Vaginal erosion due to surgical mesh     Vaginal vault prolapse      Past Surgical History:   Procedure Laterality Date    COLONOSCOPY  2013    Procedure: COLONOSCOPY;  COLONOSCOPY with bx;  Surgeon: Shannan Garcia DO;  Location: [facility]    COLONOSCOPY N/A 2017    Procedure: COLONOSCOPY;  Surgeon: Ervin Hope MD;  Location: [facility]    COLONOSCOPY  2017    EXAMCOL   [facility]    ENDOSCOPY UPPER, COLONOSCOPY, COMBINED N/A 10/29/2015    Procedure: COMBINED ENDOSCOPY UPPER, COLONOSCOPY;  Surgeon: Ervin Hope MD;  Location: [facility]    ENT SURGERY      tonsillectomy    GYN SURGERY  ,    hysterectomy, prolapse repair    GYN SURGERY       x 2    GYN SURGERY  2014    pelvic floor reconstruction, [facility]    HEAD & NECK SURGERY      jaw surgey    HERNIA REPAIR       Social History     Tobacco Use    Smoking status: Never    Smokeless tobacco: Never   Substance Use Topics    Alcohol use: Not Currently     Comment: rare     Current Outpatient Medications   Medication Sig Dispense Refill    Biotin 5000 MCG TABS Take 1 tablet by mouth daily 90 tablet 1    calcium carbonate-vitamin D (CALTRATE) 600-10 MG-MCG per tablet Take 1 tablet by mouth 2 times daily.      Lutein 20 MG CAPS Take 1 capsule by mouth daily      mesalamine (LIALDA) 1.2 g EC tablet       omeprazole (PRILOSEC) 40 MG DR capsule       Prenatal Vit-Fe Fumarate-FA (PRENATAL/FOLIC ACID) TABS Take 1 tablet by mouth daily      Probiotic Product (PROBIOTIC DAILY PO) Take 1 tablet by mouth daily      Psyllium (METAMUCIL PO) Take by  "mouth daily      triamcinolone (KENALOG) 0.1 % external ointment Apply topically 3 times daily. 15 g 3    Vitamin D3 (CHOLECALCIFEROL) 125 MCG (5000 UT) tablet Take 125 mcg by mouth daily       Allergies   Allergen Reactions    Penicillins          Past medical history, past surgical history, current medications and allergies reviewed and accurate to the best of my knowledge.        OBJECTIVE:     /80   Pulse 79   Temp 98.3  F (36.8  C) (Tympanic)   Resp 20   Ht 1.6 m (5' 3\")   Wt 70.3 kg (155 lb)   LMP  (LMP Unknown)   SpO2 98%   BMI 27.46 kg/m    Body mass index is 27.46 kg/m .        Physical Exam  General Appearance: Well appearing young elderly female, appropriate appearance for age. No acute distress  Cardiac:   Musculoskeletal:  Right index finger with intact movement and strength against resistance  Dermatological: Right index finger with 2 lacerations: linear laceration over base of finger on palmar side wound edges without gapping, no active bleeding, no surrounding bruising; additional laceration over mid finger on palmar side with jagged gapping edges with exposed subcutaneous tissue and diffuse bruising, no active bleeding.  Psychological: normal affect, alert, oriented, and pleasant.       Procedural note:   Options are discussed and patient decided to proceed with the suture placement.   Risks and benefits discussed.  Verbal consent obtained.    PROCEDURE:  Laceration repair  LOCATION: right index finger palmar side over proximal phalanx and middle phalanx   LACERATION LENGTH:  Distal laceration (middle phalanx)  measures approximately 1.5+ cm and proximal laceration (proximal phalanx) measures approximately 1+ cm  ANESTHESIA:  Digital nerve block using Lidocaine 1% without Epinephrine, total of 3 ml   PREPARATION:  Soaked in warm water with Hibiclens and then cleansed with Chloraprep  CONTAMINATION: The wound is not contaminated.  FOREIGN BODIES: none  DEBRIDEMENT:  No debridement "   TENDON INVOLVEMENT: none  CLOSURE:  Wound closed in one layer.  Skin closed with total of 9 sutures using 4.0 Ethilon - Distal laceration with 6 sutures and proximal laceration with 3 sutures.   TECHNIQUE: interrupted  APPROXIMATION: close  APROXIMATION DIFFICULTY: simple     Patient tolerance: Patient tolerated the procedure well with no immediate complications.

## 2025-05-05 ENCOUNTER — ALLIED HEALTH/NURSE VISIT (OUTPATIENT)
Dept: FAMILY MEDICINE | Facility: OTHER | Age: 75
End: 2025-05-05
Attending: STUDENT IN AN ORGANIZED HEALTH CARE EDUCATION/TRAINING PROGRAM
Payer: COMMERCIAL

## 2025-05-05 DIAGNOSIS — Z48.02 VISIT FOR SUTURE REMOVAL: Primary | ICD-10-CM

## 2025-05-05 NOTE — PROGRESS NOTES
Fina Matt presents to the clinic for removal of sutures and sutures. The patient has had sutures in place for 13 days. There has been no patient reported signs or symptoms of infection or drainage 7  sutures are seen and located on the right pointer finger (9 sutures placed - 2 fell out on their own). Tetanus status is up to date. All sutures were easily removed today. Routine wound care discussed by the RN or provider. The patient will follow up as needed.    Jessica Franco LPN LPN....................  5/5/2025   12:03 PM

## 2025-05-22 ENCOUNTER — OFFICE VISIT (OUTPATIENT)
Dept: FAMILY MEDICINE | Facility: OTHER | Age: 75
End: 2025-05-22
Payer: COMMERCIAL

## 2025-05-22 VITALS
BODY MASS INDEX: 27.28 KG/M2 | DIASTOLIC BLOOD PRESSURE: 66 MMHG | TEMPERATURE: 98 F | OXYGEN SATURATION: 98 % | HEART RATE: 60 BPM | WEIGHT: 154 LBS | RESPIRATION RATE: 18 BRPM | SYSTOLIC BLOOD PRESSURE: 122 MMHG
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
## 2025-05-22 DIAGNOSIS — S40.261A TICK BITE OF RIGHT SHOULDER, INITIAL ENCOUNTER: Primary | ICD-10-CM

## 2025-05-22 PROCEDURE — G0463 HOSPITAL OUTPT CLINIC VISIT: HCPCS

## 2025-05-22 RX ORDER — DOXYCYCLINE 100 MG/1
200 CAPSULE ORAL ONCE
Qty: 2 CAPSULE | Refills: 0 | Status: SHIPPED | OUTPATIENT
Start: 2025-05-22 | End: 2025-05-22

## 2025-05-22 ASSESSMENT — PAIN SCALES - GENERAL: PAINLEVEL_OUTOF10: NO PAIN (0)

## 2025-05-22 NOTE — PROGRESS NOTES
ASSESSMENT/PLAN:    I have reviewed the nursing notes.  I have reviewed the findings, diagnosis, plan and need for follow up with the patient.    1. Tick bite of right shoulder, initial encounter (Primary)  - doxycycline hyclate (VIBRAMYCIN) 100 MG capsule; Take 2 capsules (200 mg) by mouth once for 1 dose.  Dispense: 2 capsule; Refill: 0    Head of tick was noted to still be remaining in patient's skin, this was successfully removed in clinic.  Doxycycline 200 mg one time for prophylactic dosing  No lab work needed today as it is too soon for accurate results   Instructed to wash tick bite with soap and water. Apply antibiotic ointment to site 1-2 times daily until healed.  Watch for flu like illness such as fevers and chills; arthritis type pain such as joint pains and stiffness.   Follow up if development of any of these symptoms for further evaluation.     Discussed warning signs/symptoms indicative of need to f/u    Follow up if symptoms persist or worsen or concerns    I explained my diagnostic considerations and recommendations to the patient, who voiced understanding and agreement with the treatment plan. All questions were answered. We discussed potential side effects of any prescribed or recommended therapies, as well as expectations for response to treatments.    RANDA Torres CNP  5/22/2025  11:11 AM    HPI:    Fina Matt is a 74 year old female  who presents to [facility] today for concerns of tick bite    Tick Bite         Date of Bite: removed tick this morning         Location: right posterior shoulder         Was tick removed entirely? No -head of tick remains in patient's skin         Symptoms since bite? none         Have you noticed a red ring? No      Past Medical History:   Diagnosis Date    Arthritis     Chronic pain     Noninfectious ileitis     PONV (postoperative nausea and vomiting)     ALSO SLOW TO WAKE UP    Urinary incontinence with continuous leakage     Vaginal erosion  due to surgical mesh     Vaginal vault prolapse      Past Surgical History:   Procedure Laterality Date    COLONOSCOPY  2013    Procedure: COLONOSCOPY;  COLONOSCOPY with bx;  Surgeon: Shannan Garcia DO;  Location: [facility]    COLONOSCOPY N/A 2017    Procedure: COLONOSCOPY;  Surgeon: Ervin Hope MD;  Location: [facility]    COLONOSCOPY  2017    EXAMCOL   [facility]    ENDOSCOPY UPPER, COLONOSCOPY, COMBINED N/A 10/29/2015    Procedure: COMBINED ENDOSCOPY UPPER, COLONOSCOPY;  Surgeon: Ervin Hope MD;  Location: [facility]    ENT SURGERY      tonsillectomy    GYN SURGERY  ,    hysterectomy, prolapse repair    GYN SURGERY       x 2    GYN SURGERY  2014    pelvic floor reconstruction, [facility]    HEAD & NECK SURGERY      jaw surgey    HERNIA REPAIR       Social History     Tobacco Use    Smoking status: Never    Smokeless tobacco: Never   Substance Use Topics    Alcohol use: Not Currently     Comment: rare     Current Outpatient Medications   Medication Sig Dispense Refill    Biotin 5000 MCG TABS Take 1 tablet by mouth daily 90 tablet 1    calcium carbonate-vitamin D (CALTRATE) 600-10 MG-MCG per tablet Take 1 tablet by mouth 2 times daily.      Lutein 20 MG CAPS Take 1 capsule by mouth daily      mesalamine (LIALDA) 1.2 g EC tablet       omeprazole (PRILOSEC) 40 MG DR capsule       Prenatal Vit-Fe Fumarate-FA (PRENATAL/FOLIC ACID) TABS Take 1 tablet by mouth daily      Probiotic Product (PROBIOTIC DAILY PO) Take 1 tablet by mouth daily      Psyllium (METAMUCIL PO) Take by mouth daily      triamcinolone (KENALOG) 0.1 % external ointment Apply topically 3 times daily. 15 g 3    Vitamin D3 (CHOLECALCIFEROL) 125 MCG (5000 UT) tablet Take 125 mcg by mouth daily       Allergies   Allergen Reactions    Penicillins      Past medical history, past surgical history, current medications and allergies reviewed and accurate to the best of my knowledge.      ROS:  Refer to HPI    /66   Pulse 60    Temp 98  F (36.7  C) (Tympanic)   Resp 18   Wt 69.9 kg (154 lb)   LMP  (LMP Unknown)   SpO2 98%   BMI 27.28 kg/m      EXAM:  General Appearance: Well appearing 74 year old female, appropriate appearance for age. No acute distress   Musculoskeletal:  Equal movement of bilateral upper extremities.  Equal movement of bilateral lower extremities.  Normal gait.    Dermatological: Tick bite of right upper posterior shoulder with mild surrounding erythema, head of tick remains in patient's skin, no signs of infection noted  Neuro: Alert and oriented to person, place, and time.    Psychological: normal affect, alert, oriented, and pleasant.

## 2025-05-22 NOTE — NURSING NOTE
"Chief Complaint   Patient presents with    Tick Bite     Right shoulder     Patient here for tick bite of right shoulder that happened yesterday. Appears to be a deer tick.     Initial /66   Pulse 60   Temp 98  F (36.7  C) (Tympanic)   Resp 18   Wt 69.9 kg (154 lb)   LMP  (LMP Unknown)   SpO2 98%   BMI 27.28 kg/m   Estimated body mass index is 27.28 kg/m  as calculated from the following:    Height as of 4/22/25: 1.6 m (5' 3\").    Weight as of this encounter: 69.9 kg (154 lb).  Medication Review: complete    The next two questions are to help us understand your food security.  If you are feeling you need any assistance in this area, we have resources available to support you today.          5/22/2025   SDOH- Food Insecurity   Within the past 12 months, did you worry that your food would run out before you got money to buy more? N   Within the past 12 months, did the food you bought just not last and you didn t have money to get more? N         Health Care Directive:  Patient does not have a Health Care Directive: Discussed advance care planning with patient; however, patient declined at this time.    Angelique Cade, LPN      "

## 2025-05-22 NOTE — PATIENT INSTRUCTIONS
Please refer to your AVS for follow up and pain/symptoms management recommendations (I.e.: medications, helpful conservative treatment modalities, appropriate follow up if need to a specialist or family practice, etc.). Please return to either your primary care provider rapid/urgent care clinic if your symptoms change or worsen.     Discharge instructions:  -If you were prescribed a medication(s), please take this as prescribed/directed  -Monitor your symptoms, if changing/worsening, return to UC/ER or PCP for follow up    Tick Bite   -For some, labs are also drawn to check for Lyme disease (and/or other tick borne illness) - if you had labs drawn, please note that these labs can take 3-7 days to return, a member of the team should reach out to you with your results, if you do not receive these, please reach out.   -Please take the course of antibiotics until completion (if prescribed).    -For prevention of further bites, we recommend long shirts/pants while outdoors, tick repellant with > 20% DEET, take a shower or bathe within 2 hours of being outdoors, check yourself for ticks (look in unusual locations such as hair, behind ears/knees, etc.).   -For pain control (if needed), if you are able to take Ibuprofen and Tylenol, we recommend alternating these (see note below). Do not wear a patch over your eye (unless directed to do so).    -Alternate every 4 hours as needed. I.e.: Ibuprofen at 8am, Tylenol 12pm, Ibuprofen 4pm    -Daily maximum of Tylenol is 4000mg (recommend staying under 3000mg)   -Daily maximum of Ibuprofen is 3200mg (take no more than six 200mg pills a day)    Recommend to hold daily multivitamin  Doxycycline as this can make the medication less effective.  We also recommend that you watch your sunlight exposure as you will burn quicker than usual.    Additional Information:  Any remaining embedded matter can be left and will clear naturally.   Monitor the area where ticks were removed as they can  become infected also. May use antibacterial ointment.    -Monitor for the below symptoms for 30 days and call your healthcare provider if you get any of the following:   ? Fatigue    ? Headache    ? Neck stiffness    ? Myalgias/muscle soreness   ? Arthralgias/joint pain   ? Regional lymphadenopathy/swollen lymph nodes   ? Fever    * Keep vigilant with tick checks.    Risk of Lyme disease is very low with the tick has been attached for fewer than 36 hours.    Common locations to check for ticks in the future include entering around her ears, and and around the hair, inside the bellybutton, under your arms, around her waist, between your legs and the back sides of your knees.    Approach to prophylaxis -- per up to date guidelines, they agree with the Infectious Diseases Society of [place] (IDSA) guidelines that recommend antibiotic prophylaxis only in patients who meet all of the following criteria:  ?Attached tick identified as an adult or nymphal I. scapularis tick (deer tick).  ?Tick is estimated to have been attached for >=36 hours (by degree of engorgement or time of exposure).  ?Prophylaxis is begun within 72 hours of tick removal.  ?Local rate of infection of ticks with B. burgdorferi is >=20 percent (these rates of infection have been shown to occur in parts of [place], parts of the [place], and parts of [place] and [place]).

## (undated) RX ORDER — LIDOCAINE HYDROCHLORIDE 10 MG/ML
INJECTION, SOLUTION EPIDURAL; INFILTRATION; INTRACAUDAL; PERINEURAL
Status: DISPENSED
Start: 2025-04-22